# Patient Record
Sex: FEMALE | Race: WHITE | Employment: FULL TIME | URBAN - METROPOLITAN AREA
[De-identification: names, ages, dates, MRNs, and addresses within clinical notes are randomized per-mention and may not be internally consistent; named-entity substitution may affect disease eponyms.]

---

## 2017-02-06 ENCOUNTER — COMMUNICATION - HEALTHEAST (OUTPATIENT)
Dept: FAMILY MEDICINE | Facility: CLINIC | Age: 52
End: 2017-02-06

## 2017-02-06 DIAGNOSIS — K21.9 GERD (GASTROESOPHAGEAL REFLUX DISEASE): ICD-10-CM

## 2017-02-06 DIAGNOSIS — R56.9 SEIZURE (H): ICD-10-CM

## 2017-02-09 ENCOUNTER — OFFICE VISIT - HEALTHEAST (OUTPATIENT)
Dept: FAMILY MEDICINE | Facility: CLINIC | Age: 52
End: 2017-02-09

## 2017-02-09 ENCOUNTER — COMMUNICATION - HEALTHEAST (OUTPATIENT)
Dept: FAMILY MEDICINE | Facility: CLINIC | Age: 52
End: 2017-02-09

## 2017-02-09 DIAGNOSIS — B02.9 SHINGLES: ICD-10-CM

## 2017-02-15 ENCOUNTER — OFFICE VISIT - HEALTHEAST (OUTPATIENT)
Dept: FAMILY MEDICINE | Facility: CLINIC | Age: 52
End: 2017-02-15

## 2017-02-15 ENCOUNTER — COMMUNICATION - HEALTHEAST (OUTPATIENT)
Dept: SCHEDULING | Facility: CLINIC | Age: 52
End: 2017-02-15

## 2017-02-15 DIAGNOSIS — R42 LIGHT HEADEDNESS: ICD-10-CM

## 2017-02-15 DIAGNOSIS — I95.1 ORTHOSTATIC HYPOTENSION: ICD-10-CM

## 2017-02-15 DIAGNOSIS — Z00.00 ROUTINE GENERAL MEDICAL EXAMINATION AT A HEALTH CARE FACILITY: ICD-10-CM

## 2017-02-15 DIAGNOSIS — G40.309 GENERALIZED CONVULSIVE EPILEPSY (H): ICD-10-CM

## 2017-02-15 DIAGNOSIS — F43.9 STRESS AT HOME: ICD-10-CM

## 2017-02-15 DIAGNOSIS — B02.9 SHINGLES: ICD-10-CM

## 2017-02-15 LAB
CHOLEST SERPL-MCNC: 278 MG/DL
FASTING STATUS PATIENT QL REPORTED: NO
HBA1C MFR BLD: 4.8 % (ref 3.5–6)
HDLC SERPL-MCNC: 80 MG/DL
LDLC SERPL CALC-MCNC: 178 MG/DL
TRIGL SERPL-MCNC: 98 MG/DL

## 2017-02-15 ASSESSMENT — MIFFLIN-ST. JEOR: SCORE: 1415.16

## 2017-02-17 ENCOUNTER — COMMUNICATION - HEALTHEAST (OUTPATIENT)
Dept: FAMILY MEDICINE | Facility: CLINIC | Age: 52
End: 2017-02-17

## 2017-02-20 ENCOUNTER — COMMUNICATION - HEALTHEAST (OUTPATIENT)
Dept: FAMILY MEDICINE | Facility: CLINIC | Age: 52
End: 2017-02-20

## 2017-02-21 ENCOUNTER — OFFICE VISIT - HEALTHEAST (OUTPATIENT)
Dept: FAMILY MEDICINE | Facility: CLINIC | Age: 52
End: 2017-02-21

## 2017-02-21 DIAGNOSIS — F32.A ANXIETY AND DEPRESSION: ICD-10-CM

## 2017-02-21 DIAGNOSIS — G40.309 GENERALIZED CONVULSIVE EPILEPSY (H): ICD-10-CM

## 2017-02-21 DIAGNOSIS — B02.9 SHINGLES: ICD-10-CM

## 2017-02-21 DIAGNOSIS — F41.9 ANXIETY AND DEPRESSION: ICD-10-CM

## 2017-02-21 ASSESSMENT — MIFFLIN-ST. JEOR: SCORE: 1398.74

## 2017-04-12 ENCOUNTER — COMMUNICATION - HEALTHEAST (OUTPATIENT)
Dept: FAMILY MEDICINE | Facility: CLINIC | Age: 52
End: 2017-04-12

## 2017-04-17 ENCOUNTER — OFFICE VISIT - HEALTHEAST (OUTPATIENT)
Dept: FAMILY MEDICINE | Facility: CLINIC | Age: 52
End: 2017-04-17

## 2017-04-17 DIAGNOSIS — R56.9 CONVULSIONS, UNSPECIFIED CONVULSION TYPE (H): ICD-10-CM

## 2017-04-17 ASSESSMENT — MIFFLIN-ST. JEOR: SCORE: 1423.69

## 2017-06-01 ENCOUNTER — COMMUNICATION - HEALTHEAST (OUTPATIENT)
Dept: FAMILY MEDICINE | Facility: CLINIC | Age: 52
End: 2017-06-01

## 2017-06-01 DIAGNOSIS — R56.9 SEIZURE (H): ICD-10-CM

## 2017-07-05 ENCOUNTER — COMMUNICATION - HEALTHEAST (OUTPATIENT)
Dept: FAMILY MEDICINE | Facility: CLINIC | Age: 52
End: 2017-07-05

## 2017-07-05 DIAGNOSIS — R42 LIGHT HEADEDNESS: ICD-10-CM

## 2017-07-05 DIAGNOSIS — Z00.00 ROUTINE GENERAL MEDICAL EXAMINATION AT A HEALTH CARE FACILITY: ICD-10-CM

## 2017-07-05 DIAGNOSIS — F43.9 STRESS AT HOME: ICD-10-CM

## 2017-09-15 ENCOUNTER — RECORDS - HEALTHEAST (OUTPATIENT)
Dept: ADMINISTRATIVE | Facility: OTHER | Age: 52
End: 2017-09-15

## 2017-10-05 ENCOUNTER — OFFICE VISIT - HEALTHEAST (OUTPATIENT)
Dept: FAMILY MEDICINE | Facility: CLINIC | Age: 52
End: 2017-10-05

## 2017-10-05 DIAGNOSIS — R42 VERTIGO: ICD-10-CM

## 2017-10-19 ENCOUNTER — COMMUNICATION - HEALTHEAST (OUTPATIENT)
Dept: OTOLARYNGOLOGY | Facility: CLINIC | Age: 52
End: 2017-10-19

## 2017-10-27 ENCOUNTER — COMMUNICATION - HEALTHEAST (OUTPATIENT)
Dept: OTOLARYNGOLOGY | Facility: CLINIC | Age: 52
End: 2017-10-27

## 2017-12-07 ENCOUNTER — COMMUNICATION - HEALTHEAST (OUTPATIENT)
Dept: SCHEDULING | Facility: CLINIC | Age: 52
End: 2017-12-07

## 2018-03-28 ENCOUNTER — COMMUNICATION - HEALTHEAST (OUTPATIENT)
Dept: FAMILY MEDICINE | Facility: CLINIC | Age: 53
End: 2018-03-28

## 2018-03-28 DIAGNOSIS — R56.9 SEIZURE (H): ICD-10-CM

## 2018-05-03 ENCOUNTER — COMMUNICATION - HEALTHEAST (OUTPATIENT)
Dept: SCHEDULING | Facility: CLINIC | Age: 53
End: 2018-05-03

## 2018-05-04 ENCOUNTER — OFFICE VISIT - HEALTHEAST (OUTPATIENT)
Dept: FAMILY MEDICINE | Facility: CLINIC | Age: 53
End: 2018-05-04

## 2018-05-04 DIAGNOSIS — R42 LIGHT HEADEDNESS: ICD-10-CM

## 2018-05-04 DIAGNOSIS — F43.9 STRESS AT HOME: ICD-10-CM

## 2018-05-04 DIAGNOSIS — Z00.00 ROUTINE GENERAL MEDICAL EXAMINATION AT A HEALTH CARE FACILITY: ICD-10-CM

## 2018-05-04 ASSESSMENT — MIFFLIN-ST. JEOR: SCORE: 1514.41

## 2018-05-08 ENCOUNTER — COMMUNICATION - HEALTHEAST (OUTPATIENT)
Dept: SCHEDULING | Facility: CLINIC | Age: 53
End: 2018-05-08

## 2018-05-08 DIAGNOSIS — R56.9 SEIZURES (H): ICD-10-CM

## 2018-05-08 DIAGNOSIS — R42 DIZZINESS: ICD-10-CM

## 2018-05-10 ENCOUNTER — COMMUNICATION - HEALTHEAST (OUTPATIENT)
Dept: TELEHEALTH | Facility: CLINIC | Age: 53
End: 2018-05-10

## 2018-05-10 ENCOUNTER — HOSPITAL ENCOUNTER (OUTPATIENT)
Dept: MRI IMAGING | Facility: CLINIC | Age: 53
Discharge: HOME | End: 2018-05-10
Attending: FAMILY MEDICINE

## 2018-05-10 DIAGNOSIS — R42 DIZZINESS: ICD-10-CM

## 2018-05-10 DIAGNOSIS — R56.9 SEIZURES (H): ICD-10-CM

## 2018-05-11 ENCOUNTER — COMMUNICATION - HEALTHEAST (OUTPATIENT)
Dept: FAMILY MEDICINE | Facility: CLINIC | Age: 53
End: 2018-05-11

## 2018-05-18 ENCOUNTER — COMMUNICATION - HEALTHEAST (OUTPATIENT)
Dept: FAMILY MEDICINE | Facility: CLINIC | Age: 53
End: 2018-05-18

## 2018-05-18 DIAGNOSIS — R42 VERTIGO: ICD-10-CM

## 2018-05-22 ENCOUNTER — COMMUNICATION - HEALTHEAST (OUTPATIENT)
Dept: ADMINISTRATIVE | Facility: CLINIC | Age: 53
End: 2018-05-22

## 2018-06-11 ENCOUNTER — COMMUNICATION - HEALTHEAST (OUTPATIENT)
Dept: ADMINISTRATIVE | Facility: CLINIC | Age: 53
End: 2018-06-11

## 2018-06-21 ENCOUNTER — OFFICE VISIT (OUTPATIENT)
Dept: URGENT CARE | Facility: URGENT CARE | Age: 53
End: 2018-06-21
Payer: COMMERCIAL

## 2018-06-21 VITALS
DIASTOLIC BLOOD PRESSURE: 60 MMHG | HEIGHT: 65 IN | HEART RATE: 84 BPM | SYSTOLIC BLOOD PRESSURE: 122 MMHG | TEMPERATURE: 98.2 F | WEIGHT: 197 LBS | BODY MASS INDEX: 32.82 KG/M2 | OXYGEN SATURATION: 98 %

## 2018-06-21 DIAGNOSIS — N30.00 ACUTE CYSTITIS WITHOUT HEMATURIA: Primary | ICD-10-CM

## 2018-06-21 DIAGNOSIS — R30.0 DYSURIA: ICD-10-CM

## 2018-06-21 DIAGNOSIS — R82.90 NONSPECIFIC FINDING ON EXAMINATION OF URINE: ICD-10-CM

## 2018-06-21 LAB
ALBUMIN UR-MCNC: 100 MG/DL
APPEARANCE UR: CLEAR
BILIRUB UR QL STRIP: NEGATIVE
COLOR UR AUTO: YELLOW
GLUCOSE UR STRIP-MCNC: NEGATIVE MG/DL
HGB UR QL STRIP: ABNORMAL
KETONES UR STRIP-MCNC: ABNORMAL MG/DL
LEUKOCYTE ESTERASE UR QL STRIP: ABNORMAL
NITRATE UR QL: NEGATIVE
PH UR STRIP: 6.5 PH (ref 5–7)
RBC #/AREA URNS AUTO: ABNORMAL /HPF
SOURCE: ABNORMAL
SP GR UR STRIP: 1.02 (ref 1–1.03)
UROBILINOGEN UR STRIP-ACNC: 0.2 EU/DL (ref 0.2–1)
WBC #/AREA URNS AUTO: ABNORMAL /HPF

## 2018-06-21 PROCEDURE — 87088 URINE BACTERIA CULTURE: CPT | Performed by: INTERNAL MEDICINE

## 2018-06-21 PROCEDURE — 81001 URINALYSIS AUTO W/SCOPE: CPT | Performed by: INTERNAL MEDICINE

## 2018-06-21 PROCEDURE — 87086 URINE CULTURE/COLONY COUNT: CPT | Performed by: INTERNAL MEDICINE

## 2018-06-21 PROCEDURE — 99203 OFFICE O/P NEW LOW 30 MIN: CPT | Performed by: INTERNAL MEDICINE

## 2018-06-21 PROCEDURE — 87186 SC STD MICRODIL/AGAR DIL: CPT | Performed by: INTERNAL MEDICINE

## 2018-06-21 RX ORDER — SULFAMETHOXAZOLE/TRIMETHOPRIM 800-160 MG
1 TABLET ORAL 2 TIMES DAILY
Qty: 10 TABLET | Refills: 0 | Status: SHIPPED | OUTPATIENT
Start: 2018-06-21 | End: 2018-06-26

## 2018-06-21 ASSESSMENT — ENCOUNTER SYMPTOMS
HEMATURIA: 0
FEVER: 0
CHILLS: 0

## 2018-06-21 NOTE — MR AVS SNAPSHOT
"              After Visit Summary   2018    Ana Barrera    MRN: [id number]           Patient Information     Date Of Birth          1965        Visit Information        Provider Department      2018 8:00 PM Zena Rivers MD [facility]        Today's Diagnoses     Acute cystitis without hematuria    -  1    Dysuria        Nonspecific finding on examination of urine           Follow-ups after your visit        Who to contact     If you have questions or need follow up information about today's clinic visit or your schedule please contact [facility] directly at [phone].  Normal or non-critical lab and imaging results will be communicated to you by Betablehart, letter or phone within 4 business days after the clinic has received the results. If you do not hear from us within 7 days, please contact the clinic through Betablehart or phone. If you have a critical or abnormal lab result, we will notify you by phone as soon as possible.  Submit refill requests through Wish Days or call your pharmacy and they will forward the refill request to us. Please allow 3 business days for your refill to be completed.          Additional Information About Your Visit        MyChart Information     Wish Days lets you send messages to your doctor, view your test results, renew your prescriptions, schedule appointments and more. To sign up, go to [email or web address] . Click on \"Log in\" on the left side of the screen, which will take you to the Welcome page. Then click on \"Sign up Now\" on the right side of the page.     You will be asked to enter the access code listed below, as well as some personal information. Please follow the directions to create your username and password.     Your access code is: [email or web address]  Expires: 2018  8:40 PM     Your access code will  in 90 days. If you need help or a new code, please call your [place] clinic or " "[phone].        Care EveryWhere ID     This is your Care EveryWhere ID. This could be used by other organizations to access your [place] medical records  [id number]        Your Vitals Were     Pulse Temperature Height Pulse Oximetry BMI (Body Mass Index)       84 98.2  F (36.8  C) (Oral) 5' 5\" (1.651 m) 98% 32.78 kg/m2        Blood Pressure from Last 3 Encounters:   06/21/18 122/60    Weight from Last 3 Encounters:   06/21/18 197 lb (89.4 kg)              We Performed the Following     *UA reflex to Microscopic and Culture ([place] [facility] (except [place] and [place])     Urine Culture Aerobic Bacterial     Urine Microscopic          Today's Medication Changes          These changes are accurate as of 6/21/18  8:40 PM.  If you have any questions, ask your nurse or doctor.               Start taking these medicines.        Dose/Directions    sulfamethoxazole-trimethoprim 800-160 MG per tablet   Commonly known as:  BACTRIM DS/SEPTRA DS   Used for:  Acute cystitis without hematuria   Started by:  Zena Rivers MD        Dose:  1 tablet   Take 1 tablet by mouth 2 times daily for 5 days   Quantity:  10 tablet   Refills:  0            Where to get your medicines      These medications were sent to [id number] - [place], [place] - [address] AT [facility] [place]  [address], [place] [address]     Phone:  [phone]     sulfamethoxazole-trimethoprim 800-160 MG per tablet                Primary Care Provider Office Phone # Fax #    Aj SEVILLA Darinel [phone]       [facility] [address] [address]        Equal Access to Services     AUBRIE MITCHELL AH: Hadii aad ku hadashmichael Soomaali, waaxda luqadaha, qaybta kaalmada eddieyalis, jeremi schroeder. So [facility] [phone].    ATENCIÓN: Si habla español, tiene a jimenez disposición servicios gratuitos de asistencia lingüística. Llame al [phone].    We comply " with applicable federal civil rights laws and [place] laws. We do not discriminate on the basis of race, color, national origin, age, disability, sex, sexual orientation, or gender identity.            Thank you!     Thank you for choosing [facility]  for your care. Our goal is always to provide you with excellent care. Hearing back from our patients is one way we can continue to improve our services. Please take a few minutes to complete the written survey that you may receive in the mail after your visit with us. Thank you!             Your Updated Medication List - Protect others around you: Learn how to safely use, store and throw away your medicines at www.disposemymeds.org.          This list is accurate as of 6/21/18  8:40 PM.  Always use your most recent med list.                   Brand Name Dispense Instructions for use Diagnosis    DEPAKOTE PO           sulfamethoxazole-trimethoprim 800-160 MG per tablet    BACTRIM DS/SEPTRA DS    10 tablet    Take 1 tablet by mouth 2 times daily for 5 days    Acute cystitis without hematuria

## 2018-06-22 NOTE — NURSING NOTE
"Ana Barrera;   Chief Complaint   Patient presents with     Urinary Problem     urinary frequency, pressure, bloating onset a few days ago      Urgent Care     Initial /60 (BP Location: Left arm, Patient Position: Chair, Cuff Size: Adult Regular)  Pulse 84  Temp 98.2  F (36.8  C) (Oral)  Ht 5' 5\" (1.651 m)  Wt 197 lb (89.4 kg)  SpO2 98%  BMI 32.78 kg/m2 Estimated body mass index is 32.78 kg/(m^2) as calculated from the following:    Height as of this encounter: 5' 5\" (1.651 m).    Weight as of this encounter: 197 lb (89.4 kg)..  BP completed using cuff size regular.  Bertha Quiroga R.N.  "

## 2018-06-22 NOTE — PROGRESS NOTES
"SUBJECTIVE:   Ana Barrera is a 52 year old female presenting with a chief complaint of   Chief Complaint   Patient presents with     Urinary Problem     urinary frequency, pressure, bloating onset a few days ago      Urgent Care       She is a new patient of [facility].    UTI    Onset of symptoms was today  Course of illness is worsening  Current and associated symptoms frequency, urgency and suprapubic pain and pressure  Treatment and measures tried None  Predisposing factors include history of frequent UTI's  Patient denies vaginal discharge, vaginal odor and vaginal itching      Bloating yesterday  Some back aches        Review of Systems   Constitutional: Negative for chills and fever.   Genitourinary: Negative for hematuria.       Past Medical History:   Diagnosis Date     Epilepsy (H)      No family history on file.  Current Outpatient Prescriptions   Medication Sig Dispense Refill     Divalproex Sodium (DEPAKOTE PO)        sulfamethoxazole-trimethoprim (BACTRIM DS/SEPTRA DS) 800-160 MG per tablet Take 1 tablet by mouth 2 times daily for 5 days 10 tablet 0     Social History   Substance Use Topics     Smoking status: Never Smoker     Smokeless tobacco: Never Used     Alcohol use Not on file       OBJECTIVE  /60 (BP Location: Left arm, Patient Position: Chair, Cuff Size: Adult Regular)  Pulse 84  Temp 98.2  F (36.8  C) (Oral)  Ht 5' 5\" (1.651 m)  Wt 197 lb (89.4 kg)  SpO2 98%  BMI 32.78 kg/m2    Physical Exam   Constitutional: She appears well-developed and well-nourished.   Abdominal: Soft.   Mild suprapubic discomfort   Musculoskeletal:   No cva tenderness       Labs:  Results for orders placed or performed in visit on 06/21/18 (from the past 24 hour(s))   *UA reflex to Microscopic and Culture ([place] and [facility] (except [place] and [place])   Result Value Ref Range    Color Urine Yellow     Appearance Urine Clear     Glucose Urine Negative NEG^Negative mg/dL    Bilirubin Urine " Negative NEG^Negative    Ketones Urine Trace (A) NEG^Negative mg/dL    Specific Gravity Urine 1.025 1.003 - 1.035    Blood Urine Large (A) NEG^Negative    pH Urine 6.5 5.0 - 7.0 pH    Protein Albumin Urine 100 (A) NEG^Negative mg/dL    Urobilinogen Urine 0.2 0.2 - 1.0 EU/dL    Nitrite Urine Negative NEG^Negative    Leukocyte Esterase Urine Moderate (A) NEG^Negative    Source Midstream Urine    Urine Microscopic   Result Value Ref Range    WBC Urine  (A) OTO5^0 - 5 /HPF    RBC Urine 25-50 (A) OTO2^O - 2 /HPF     ASSESSMENT:      ICD-10-CM    1. Acute cystitis without hematuria N30.00 sulfamethoxazole-trimethoprim (BACTRIM DS/SEPTRA DS) 800-160 MG per tablet   2. Dysuria R30.0 *UA reflex to Microscopic and Culture ([place] and [facility] (except [place] and [place])     Urine Microscopic   3. Nonspecific finding on examination of urine R82.90 Urine Culture Aerobic Bacterial        Medical Decision Making:      Serious Comorbid Conditions:  Adult:  epilepsy    PLAN:    UTI Adult:  NO Sulfa Allergy: Septra DS one tablet twice daily for 3 - 5 days    Followup:    If not improving or if condition worsens, follow up with your Primary Care Provider

## 2018-06-25 LAB
BACTERIA SPEC CULT: ABNORMAL
SPECIMEN SOURCE: ABNORMAL

## 2018-06-26 ENCOUNTER — COMMUNICATION - HEALTHEAST (OUTPATIENT)
Dept: ADMINISTRATIVE | Facility: CLINIC | Age: 53
End: 2018-06-26

## 2018-07-31 ENCOUNTER — COMMUNICATION - HEALTHEAST (OUTPATIENT)
Dept: FAMILY MEDICINE | Facility: CLINIC | Age: 53
End: 2018-07-31

## 2018-07-31 DIAGNOSIS — R56.9 SEIZURE (H): ICD-10-CM

## 2018-08-13 ENCOUNTER — COMMUNICATION - HEALTHEAST (OUTPATIENT)
Dept: SCHEDULING | Facility: CLINIC | Age: 53
End: 2018-08-13

## 2018-10-14 ENCOUNTER — COMMUNICATION - HEALTHEAST (OUTPATIENT)
Dept: FAMILY MEDICINE | Facility: CLINIC | Age: 53
End: 2018-10-14

## 2018-10-14 DIAGNOSIS — R56.9 SEIZURE (H): ICD-10-CM

## 2018-12-28 ENCOUNTER — COMMUNICATION - HEALTHEAST (OUTPATIENT)
Dept: SCHEDULING | Facility: CLINIC | Age: 53
End: 2018-12-28

## 2019-01-15 ENCOUNTER — COMMUNICATION - HEALTHEAST (OUTPATIENT)
Dept: FAMILY MEDICINE | Facility: CLINIC | Age: 54
End: 2019-01-15

## 2019-01-15 DIAGNOSIS — R42 LIGHT HEADEDNESS: ICD-10-CM

## 2019-01-15 DIAGNOSIS — Z00.00 ROUTINE GENERAL MEDICAL EXAMINATION AT A HEALTH CARE FACILITY: ICD-10-CM

## 2019-01-15 DIAGNOSIS — F43.9 STRESS AT HOME: ICD-10-CM

## 2019-02-15 ENCOUNTER — COMMUNICATION - HEALTHEAST (OUTPATIENT)
Dept: FAMILY MEDICINE | Facility: CLINIC | Age: 54
End: 2019-02-15

## 2019-02-26 ENCOUNTER — COMMUNICATION - HEALTHEAST (OUTPATIENT)
Dept: FAMILY MEDICINE | Facility: CLINIC | Age: 54
End: 2019-02-26

## 2019-03-07 ENCOUNTER — COMMUNICATION - HEALTHEAST (OUTPATIENT)
Dept: FAMILY MEDICINE | Facility: CLINIC | Age: 54
End: 2019-03-07

## 2019-03-07 ENCOUNTER — OFFICE VISIT - HEALTHEAST (OUTPATIENT)
Dept: FAMILY MEDICINE | Facility: CLINIC | Age: 54
End: 2019-03-07

## 2019-03-07 DIAGNOSIS — R56.9 SEIZURE (H): ICD-10-CM

## 2019-03-07 DIAGNOSIS — N95.1 MENOPAUSAL SYNDROME (HOT FLASHES): ICD-10-CM

## 2019-03-07 DIAGNOSIS — F43.9 STRESS AT HOME: ICD-10-CM

## 2019-03-07 DIAGNOSIS — N95.1 SYMPTOMATIC MENOPAUSAL OR FEMALE CLIMACTERIC STATES: ICD-10-CM

## 2019-03-07 DIAGNOSIS — G47.09 OTHER INSOMNIA: ICD-10-CM

## 2019-03-07 DIAGNOSIS — F33.1 MODERATE EPISODE OF RECURRENT MAJOR DEPRESSIVE DISORDER (H): ICD-10-CM

## 2019-03-07 DIAGNOSIS — R53.83 OTHER FATIGUE: ICD-10-CM

## 2019-03-07 ASSESSMENT — MIFFLIN-ST. JEOR: SCORE: 1531.42

## 2019-04-26 ENCOUNTER — RECORDS - HEALTHEAST (OUTPATIENT)
Dept: MAMMOGRAPHY | Facility: CLINIC | Age: 54
End: 2019-04-26

## 2019-04-26 ENCOUNTER — OFFICE VISIT - HEALTHEAST (OUTPATIENT)
Dept: FAMILY MEDICINE | Facility: CLINIC | Age: 54
End: 2019-04-26

## 2019-04-26 DIAGNOSIS — Z12.11 SCREEN FOR COLON CANCER: ICD-10-CM

## 2019-04-26 DIAGNOSIS — R10.13 DYSPEPSIA: ICD-10-CM

## 2019-04-26 DIAGNOSIS — Z12.31 ENCOUNTER FOR SCREENING MAMMOGRAM FOR MALIGNANT NEOPLASM OF BREAST: ICD-10-CM

## 2019-04-26 DIAGNOSIS — Z12.31 VISIT FOR SCREENING MAMMOGRAM: ICD-10-CM

## 2019-04-26 DIAGNOSIS — B02.9 HERPES ZOSTER WITHOUT COMPLICATION: ICD-10-CM

## 2019-04-26 ASSESSMENT — MIFFLIN-ST. JEOR: SCORE: 1555.23

## 2019-06-18 ENCOUNTER — COMMUNICATION - HEALTHEAST (OUTPATIENT)
Dept: FAMILY MEDICINE | Facility: CLINIC | Age: 54
End: 2019-06-18

## 2019-06-18 DIAGNOSIS — R56.9 SEIZURE (H): ICD-10-CM

## 2019-09-10 ENCOUNTER — COMMUNICATION - HEALTHEAST (OUTPATIENT)
Dept: FAMILY MEDICINE | Facility: CLINIC | Age: 54
End: 2019-09-10

## 2019-09-13 ENCOUNTER — COMMUNICATION - HEALTHEAST (OUTPATIENT)
Dept: FAMILY MEDICINE | Facility: CLINIC | Age: 54
End: 2019-09-13

## 2019-09-24 ENCOUNTER — COMMUNICATION - HEALTHEAST (OUTPATIENT)
Dept: FAMILY MEDICINE | Facility: CLINIC | Age: 54
End: 2019-09-24

## 2019-09-24 DIAGNOSIS — R42 LIGHT HEADEDNESS: ICD-10-CM

## 2019-09-24 DIAGNOSIS — Z00.00 ROUTINE GENERAL MEDICAL EXAMINATION AT A HEALTH CARE FACILITY: ICD-10-CM

## 2019-09-24 DIAGNOSIS — R56.9 SEIZURE (H): ICD-10-CM

## 2019-09-24 DIAGNOSIS — F43.9 STRESS AT HOME: ICD-10-CM

## 2019-09-30 ENCOUNTER — COMMUNICATION - HEALTHEAST (OUTPATIENT)
Dept: FAMILY MEDICINE | Facility: CLINIC | Age: 54
End: 2019-09-30

## 2019-09-30 ENCOUNTER — OFFICE VISIT - HEALTHEAST (OUTPATIENT)
Dept: FAMILY MEDICINE | Facility: CLINIC | Age: 54
End: 2019-09-30

## 2019-09-30 DIAGNOSIS — R56.9 SEIZURE (H): ICD-10-CM

## 2019-09-30 DIAGNOSIS — F33.1 MODERATE EPISODE OF RECURRENT MAJOR DEPRESSIVE DISORDER (H): ICD-10-CM

## 2019-09-30 ASSESSMENT — MIFFLIN-ST. JEOR: SCORE: 1523.48

## 2019-10-14 ENCOUNTER — COMMUNICATION - HEALTHEAST (OUTPATIENT)
Dept: FAMILY MEDICINE | Facility: CLINIC | Age: 54
End: 2019-10-14

## 2019-10-22 ENCOUNTER — COMMUNICATION - HEALTHEAST (OUTPATIENT)
Dept: ADMINISTRATIVE | Facility: CLINIC | Age: 54
End: 2019-10-22

## 2019-12-10 ENCOUNTER — COMMUNICATION - HEALTHEAST (OUTPATIENT)
Dept: FAMILY MEDICINE | Facility: CLINIC | Age: 54
End: 2019-12-10

## 2019-12-10 DIAGNOSIS — R56.9 SEIZURE (H): ICD-10-CM

## 2020-02-26 ENCOUNTER — COMMUNICATION - HEALTHEAST (OUTPATIENT)
Dept: FAMILY MEDICINE | Facility: CLINIC | Age: 55
End: 2020-02-26

## 2020-07-01 ENCOUNTER — COMMUNICATION - HEALTHEAST (OUTPATIENT)
Dept: FAMILY MEDICINE | Facility: CLINIC | Age: 55
End: 2020-07-01

## 2020-10-14 ENCOUNTER — OFFICE VISIT - HEALTHEAST (OUTPATIENT)
Dept: FAMILY MEDICINE | Facility: CLINIC | Age: 55
End: 2020-10-14

## 2020-10-14 DIAGNOSIS — F33.1 MODERATE EPISODE OF RECURRENT MAJOR DEPRESSIVE DISORDER (H): ICD-10-CM

## 2020-10-14 DIAGNOSIS — G47.09 OTHER INSOMNIA: ICD-10-CM

## 2020-10-14 DIAGNOSIS — Z56.0 UNEMPLOYED: ICD-10-CM

## 2020-10-14 DIAGNOSIS — Z78.9 ALCOHOL CONSUMPTION HEAVY: ICD-10-CM

## 2020-10-14 DIAGNOSIS — R56.9 SEIZURE (H): ICD-10-CM

## 2020-10-14 SDOH — ECONOMIC STABILITY - INCOME SECURITY: UNEMPLOYMENT, UNSPECIFIED: Z56.0

## 2020-10-14 ASSESSMENT — PATIENT HEALTH QUESTIONNAIRE - PHQ9: SUM OF ALL RESPONSES TO PHQ QUESTIONS 1-9: 16

## 2020-10-14 ASSESSMENT — MIFFLIN-ST. JEOR: SCORE: 1574.46

## 2021-04-16 ENCOUNTER — COMMUNICATION - HEALTHEAST (OUTPATIENT)
Dept: FAMILY MEDICINE | Facility: CLINIC | Age: 56
End: 2021-04-16

## 2021-04-16 DIAGNOSIS — G47.09 OTHER INSOMNIA: ICD-10-CM

## 2021-05-27 ASSESSMENT — PATIENT HEALTH QUESTIONNAIRE - PHQ9: SUM OF ALL RESPONSES TO PHQ QUESTIONS 1-9: 16

## 2021-05-28 NOTE — PROGRESS NOTES
"  Chief Complaint   Patient presents with     Back Pain     Thinks it could be shingles          HPI:   Ana Barrera is a 53 y.o. female has pain left side mid back for the last couple of days.  Feels like when she had shingles on the right side a couple of years ago. Not an achy type of pain.  No rash noted.  No known injury.  Localized to mid back. No fever.  In addition, she has been experiencing some epigastric distress.  Mostly uncomfortableness.  Feels kind of gassy.  Not really heartburn.  No nausea.  No vomiting.  Eating ok.  Has taken pepto bismal that helps      ROS:  A 10 point comprehensive review of systems was negative except as noted.     Medications:  Current Outpatient Medications on File Prior to Visit   Medication Sig Dispense Refill     divalproex (DEPAKOTE DR) 500 MG 12 hour tablet Take 1 tablet (500 mg total) by mouth 2 (two) times a day. 180 tablet 0     estrogens, conjugated, (PREMARIN) 0.625 MG tablet Take 1 tablet (0.625 mg total) by mouth daily. 30 tablet 11     PARoxetine (PAXIL) 20 MG tablet TAKE 1 TABLET(20 MG) BY MOUTH DAILY 30 tablet 6     meclizine (ANTIVERT) 25 mg tablet Take 2 tablets (50 mg total) by mouth 3 (three) times a day as needed. 90 tablet 1     valACYclovir (VALTREX) 500 MG tablet Take 2 tablets (1,000 mg total) by mouth 3 (three) times a day. 21 tablet 0     No current facility-administered medications on file prior to visit.          Social History:  Social History     Tobacco Use     Smoking status: Passive Smoke Exposure - Never Smoker     Smokeless tobacco: Never Used     Tobacco comment:  smokes in and out of home   Substance Use Topics     Alcohol use: Not on file         Physical Exam:   Vitals:    04/26/19 1308   BP: 128/66   Pulse: 97   Resp: 16   Temp: 97.7  F (36.5  C)   TempSrc: Oral   SpO2: 97%   Weight: 208 lb (94.3 kg)   Height: 5' 6\" (1.676 m)       GENERAL:   Alert. Oriented.  EYES: Clear  HENT:  Ears: R TM pearly gray. Normal landmarks. L " TM pearly gray.  Normal landmarks  Nose: Clear.  Sinuses: Nontender.  Oropharynx:  No erythema. No exudate.  NECK: Supple. No adenopathy.  LUNGS: Clear to ascultation.  No crackles.  No wheezing  HEART: RRR  SKIN:  No rash.   BACK:  No tenderness over the site of discomfort.  No tenderness over the spine to percussion..  ABDOMEN:  +BS. Slight epigastric tenderness. Soft, no guarding, rebound, rigidity,mass, or organomegaly. No CVA tenderness          Assessment/Plan:    1. Visit for screening mammogram  Completed today  - Mammo Screening Bilateral; Future    2. Screen for colon cancer  Information given  - Ambulatory referral for Colonoscopy    3. Herpes zoster without complication  Due to similarity of the pain with previous episode with start famvir.  She has been quite uncomfortable with the pain.  Started gabapentin.  May use tylenol and ibuprofen if needed.  May try capsaicin cream.  Recheck if pain doesn't improve or worsens within 1-2 weeks.  - famciclovir (FAMVIR) 500 MG tablet; Take 1 tablet (500 mg total) by mouth 3 (three) times a day for 7 days.  Dispense: 21 tablet; Refill: 0  - gabapentin (NEURONTIN) 100 MG capsule; Take 100 mg by mouth 3 (three) times a day.  Dispense: 60 capsule; Refill: 2    4. Dyspepsia  Trial of ranitidine.  May use antacid as needed.  - ranitidine (ZANTAC) 300 MG tablet; Take 1 tablet (300 mg total) by mouth at bedtime.  Dispense: 30 tablet; Refill: 11           Janie Garcia MD      4/26/2019    The following portions of the patient's history were reviewed and updated as appropriate: allergies, current medications, past family history, past medical history, past social history, past surgical history and problem list.

## 2021-05-29 NOTE — TELEPHONE ENCOUNTER
RN cannot approve Refill Request    RN can NOT refill this medication overdue for office visits and/or labs.    Gaudencio Arana, [facility] Triage/Med Refill 6/19/2019    Requested Prescriptions   Pending Prescriptions Disp Refills     divalproex (DEPAKOTE DR) 500 MG 12 hour tablet [Pharmacy Med Name: DIVALPROEX DELAYED RELEASE 500MG TB] 180 tablet 0     Sig: TAKE 1 TABLET(500 MG) BY MOUTH TWICE DAILY       Divalproex/Valproic Acid Refill Protocol Failed - 6/18/2019  6:02 PM        Failed - LFT or AST or ALT in last 12 months     Albumin   Date Value Ref Range Status   01/15/2015 4.3 3.5 - 5.0 g/dL Final     Bilirubin, Total   Date Value Ref Range Status   01/15/2015 0.5 0.0 - 1.0 mg/dL Final     Alkaline Phosphatase   Date Value Ref Range Status   01/15/2015 59 45 - 120 U/L Final     AST   Date Value Ref Range Status   01/15/2015 14 0 - 40 U/L Final     ALT   Date Value Ref Range Status   01/15/2015 11 0 - 45 U/L Final     Protein, Total   Date Value Ref Range Status   01/15/2015 7.5 6.0 - 8.0 g/dL Final                Failed - CBC w/ plts (Hm2) in last 12 months      WBC   Date Value Ref Range Status   02/15/2017 4.4 4.0 - 11.0 thou/uL Final     Hemoglobin   Date Value Ref Range Status   02/15/2017 15.7 12.0 - 16.0 g/dL Final     Hematocrit   Date Value Ref Range Status   02/15/2017 45.6 35.0 - 47.0 % Final     Platelets   Date Value Ref Range Status   02/15/2017 236 140 - 440 thou/uL Final             Passed - PCP or prescribing provider visit in last 12 months       Last office visit with prescriber/PCP: 3/7/2019 Susannah Davison MD OR same dept: 4/26/2019 Janie Garcia MD OR same specialty: 4/26/2019 Janie Garcia MD  Last physical: Visit date not found Last MTM visit: Visit date not found   Next visit within 3 mo: Visit date not found  Next physical within 3 mo: Visit date not found  Prescriber OR PCP: Susannah Davison MD  Last diagnosis associated with med order: 1. Seizure (H)  -  divalproex (DEPAKOTE DR) 500 MG 12 hour tablet [Pharmacy Med Name: DIVALPROEX DELAYED RELEASE 500MG TB]; TAKE 1 TABLET(500 MG) BY MOUTH TWICE DAILY  Dispense: 180 tablet; Refill: 0    If protocol passes may refill for 12 months if within 3 months of last provider visit (or a total of 15 months).

## 2021-05-30 VITALS — BODY MASS INDEX: 27.88 KG/M2 | WEIGHT: 173.5 LBS | HEIGHT: 66 IN

## 2021-05-30 VITALS — HEIGHT: 66 IN | WEIGHT: 177.12 LBS | BODY MASS INDEX: 28.47 KG/M2

## 2021-05-30 VITALS — BODY MASS INDEX: 28.77 KG/M2 | WEIGHT: 179 LBS | HEIGHT: 66 IN

## 2021-05-30 VITALS — WEIGHT: 178 LBS | BODY MASS INDEX: 29.62 KG/M2

## 2021-05-31 VITALS — WEIGHT: 193.6 LBS | BODY MASS INDEX: 31.25 KG/M2

## 2021-06-01 ENCOUNTER — RECORDS - HEALTHEAST (OUTPATIENT)
Dept: ADMINISTRATIVE | Facility: CLINIC | Age: 56
End: 2021-06-01

## 2021-06-01 VITALS — WEIGHT: 199 LBS | BODY MASS INDEX: 31.98 KG/M2 | HEIGHT: 66 IN

## 2021-06-01 NOTE — TELEPHONE ENCOUNTER
This was disappointing for Dr. Phillips. Found an appointment time and invited her in, then she no-showed.

## 2021-06-01 NOTE — TELEPHONE ENCOUNTER
Who is calling:  The patient  Reason for Call:  The patient states that Yes, she needs them filled out again.  Date of last appointment with primary care: n/a  Okay to leave a detailed message: Yes

## 2021-06-01 NOTE — TELEPHONE ENCOUNTER
LVM to let her know we need a signature from her to be able to fax medical statement paperwork. If she could come in and sign in I will fax it. Forms will be at the  if she comes in to sign them. Thanks

## 2021-06-01 NOTE — PROGRESS NOTES
"  Chief Complaint   Patient presents with     Depression         HPI:   Ana Barrera is a 54 y.o. female c/o depression and anxiety.  Has been on paxil for at least several months.  Denies suicidal ideation  Recently terminated from job, losing insurance.  Has seizure disorder.  Has been having seizures about once a week.  She has missed work often and this is why she has been terminated.  She thinks it is related to not sleeping at night.  Has hot flashes and has been on premarin which is helping some.    ROS:  A 10 point comprehensive review of systems was negative except as noted.     Medications:  Current Outpatient Medications on File Prior to Visit   Medication Sig Dispense Refill     divalproex (DEPAKOTE DR) 500 MG 12 hour tablet TAKE 1 TABLET(500 MG) BY MOUTH TWICE DAILY 180 tablet 0     estrogens, conjugated, (PREMARIN) 0.625 MG tablet Take 1 tablet (0.625 mg total) by mouth daily. 30 tablet 11     PARoxetine (PAXIL) 20 MG tablet Take 1 tablet (20 mg total) by mouth daily. 90 tablet 0     gabapentin (NEURONTIN) 100 MG capsule Take 100 mg by mouth 3 (three) times a day. 60 capsule 2     meclizine (ANTIVERT) 25 mg tablet Take 2 tablets (50 mg total) by mouth 3 (three) times a day as needed. 90 tablet 1     ranitidine (ZANTAC) 300 MG tablet Take 1 tablet (300 mg total) by mouth at bedtime. 30 tablet 11     valACYclovir (VALTREX) 500 MG tablet Take 2 tablets (1,000 mg total) by mouth 3 (three) times a day. 21 tablet 0     No current facility-administered medications on file prior to visit.          Social History:  Social History     Tobacco Use     Smoking status: Passive Smoke Exposure - Never Smoker     Smokeless tobacco: Never Used     Tobacco comment:  smokes in and out of home   Substance Use Topics     Alcohol use: Not on file         Physical Exam:   Vitals:    09/30/19 1339   BP: 128/70   Pulse: 98   Resp: 16   SpO2: 99%   Weight: 201 lb (91.2 kg)   Height: 5' 6\" (1.676 m)       GEN:  " NAD  MS:  A&O.  Affect weepy Mood depressed Eye contact decreased Thoughts linear and logical.  Well-groomed.  Speech normal.  No psychomotor agitation or retardation.  Denies suicidal ideation.               Assessment/Plan:  1. Moderate episode of recurrent major depressive disorder (H)  Will increase paxil to 40 mg daily.  Strongly recommended counseling  - PARoxetine (PAXIL) 20 MG tablet; Take 2 tablets (40 mg total) by mouth daily.  Dispense: 180 tablet; Refill: 3    2. Seizure (H)  Increased seizure frequency.  She has only been taking depakote once daily for a few days.  Level unlikely to be accurate at this time.  I think this needs to be checked when she is on her usual dose.  Also needs to see neurology--she hasn't seen them for several years.  - divalproex (DEPAKOTE DR) 500 MG 12 hour tablet; TAKE 1 TABLET(500 MG) BY MOUTH TWICE DAILY  Dispense: 180 tablet; Refill: 3  - Valproic Acid, Free (Depakene , Free); Future  - Ambulatory referral to Neurology     She brings unemployment paperwork which was completed.    Offered a visit with our  to help get her insurance sorted out.  She should be seen here within three months.      Janie Garcia MD      9/30/2019    The following portions of the patient's history were reviewed and updated as appropriate: allergies, current medications, past family history, past medical history, past social history, past surgical history and problem list.

## 2021-06-01 NOTE — TELEPHONE ENCOUNTER
Former patient of [facility] & has not established care with another provider.  Please assign refill request to covering provider per Clinic standard process.      Refill Approved    Rx renewed per Medication Renewal Policy. Medication was last renewed on 1/16/19.    Hannah Phillips, [facility] Triage/Med Refill 9/25/2019     Requested Prescriptions   Pending Prescriptions Disp Refills     PARoxetine (PAXIL) 20 MG tablet 30 tablet 6     Sig: Take 1 tablet (20 mg total) by mouth daily.       SSRI Refill Protocol  Passed - 9/24/2019 12:03 PM        Passed - PCP or prescribing provider visit in last year     Last office visit with prescriber/PCP: 3/7/2019 Susannah Davison MD OR same dept: 4/26/2019 Janie Garcia MD OR same specialty: 4/26/2019 Janie Garcia MD  Last physical: Visit date not found Last MTM visit: Visit date not found   Next visit within 3 mo: Visit date not found  Next physical within 3 mo: Visit date not found  Prescriber OR PCP: Susannah Davison MD  Last diagnosis associated with med order: 1. Routine general medical examination at a health care facility  - PARoxetine (PAXIL) 20 MG tablet; Take 1 tablet (20 mg total) by mouth daily.  Dispense: 30 tablet; Refill: 6    2. Light headedness  - PARoxetine (PAXIL) 20 MG tablet; Take 1 tablet (20 mg total) by mouth daily.  Dispense: 30 tablet; Refill: 6    3. Stress at home  - PARoxetine (PAXIL) 20 MG tablet; Take 1 tablet (20 mg total) by mouth daily.  Dispense: 30 tablet; Refill: 6    4. Seizure (H)  - divalproex (DEPAKOTE DR) 500 MG 12 hour tablet  Dispense: 180 tablet; Refill: 0    If protocol passes may refill for 12 months if within 3 months of last provider visit (or a total of 15 months).             divalproex (DEPAKOTE DR) 500 MG 12 hour tablet 180 tablet 0       Divalproex/Valproic Acid Refill Protocol Failed - 9/24/2019 12:03 PM        Failed - LFT or AST or ALT in last 12 months     Albumin   Date Value Ref Range Status    01/15/2015 4.3 3.5 - 5.0 g/dL Final     Bilirubin, Total   Date Value Ref Range Status   01/15/2015 0.5 0.0 - 1.0 mg/dL Final     Alkaline Phosphatase   Date Value Ref Range Status   01/15/2015 59 45 - 120 U/L Final     AST   Date Value Ref Range Status   01/15/2015 14 0 - 40 U/L Final     ALT   Date Value Ref Range Status   01/15/2015 11 0 - 45 U/L Final     Protein, Total   Date Value Ref Range Status   01/15/2015 7.5 6.0 - 8.0 g/dL Final                Failed - CBC w/ plts (Hm2) in last 12 months      WBC   Date Value Ref Range Status   02/15/2017 4.4 4.0 - 11.0 thou/uL Final     Hemoglobin   Date Value Ref Range Status   02/15/2017 15.7 12.0 - 16.0 g/dL Final     Hematocrit   Date Value Ref Range Status   02/15/2017 45.6 35.0 - 47.0 % Final     Platelets   Date Value Ref Range Status   02/15/2017 236 140 - 440 thou/uL Final             Passed - PCP or prescribing provider visit in last 12 months       Last office visit with prescriber/PCP: 3/7/2019 Susannah Davison MD OR same dept: 4/26/2019 Janie Garcia MD OR same specialty: 4/26/2019 Janie Garcia MD  Last physical: Visit date not found Last MTM visit: Visit date not found   Next visit within 3 mo: Visit date not found  Next physical within 3 mo: Visit date not found  Prescriber OR PCP: Susannah Davison MD  Last diagnosis associated with med order: 1. Routine general medical examination at a health care facility  - PARoxetine (PAXIL) 20 MG tablet; Take 1 tablet (20 mg total) by mouth daily.  Dispense: 30 tablet; Refill: 6    2. Light headedness  - PARoxetine (PAXIL) 20 MG tablet; Take 1 tablet (20 mg total) by mouth daily.  Dispense: 30 tablet; Refill: 6    3. Stress at home  - PARoxetine (PAXIL) 20 MG tablet; Take 1 tablet (20 mg total) by mouth daily.  Dispense: 30 tablet; Refill: 6    4. Seizure (H)  - divalproex (DEPAKOTE DR) 500 MG 12 hour tablet  Dispense: 180 tablet; Refill: 0    If protocol passes may refill for 12 months if within 3  months of last provider visit (or a total of 15 months).

## 2021-06-01 NOTE — TELEPHONE ENCOUNTER
Dr Davison received FMLA forms and stated that these were completed in March.  Is a new one really needed?     When pt returns call, please relay message to pt and then forward back to me, as I have the FMLA forms at my desk.    Thank you.

## 2021-06-01 NOTE — TELEPHONE ENCOUNTER
New Appointment Needed  What is the reason for the visit:    depression check  Provider Preference: Dr. Phillips or Dr Davison  How soon do you need to be seen?: next week anytime after 3pm  Waitlist offered?: No  Okay to leave a detailed message:  Yes

## 2021-06-01 NOTE — TELEPHONE ENCOUNTER
Please this patient and let her know that she needs to establish care with a new physician.  Dr. Tena has been gone for 9 months and she needs a new PCP.  Thanks.

## 2021-06-01 NOTE — TELEPHONE ENCOUNTER
I spoke to pt at clinic. She states she does not have health ins at this time. [facility] She will call to schedule est care with Dr. Phillips when her ins is active.

## 2021-06-01 NOTE — TELEPHONE ENCOUNTER
"I have a few \"reserved spots\" at or after 3pm next week. If Ana calls Sunday evening or Monday morning, those spots should become available and she can book them. Please let her know this.  "

## 2021-06-01 NOTE — TELEPHONE ENCOUNTER
Refill Request  Did you contact pharmacy: No  Medication name:   Requested Prescriptions     Pending Prescriptions Disp Refills     PARoxetine (PAXIL) 20 MG tablet 30 tablet 6     Sig: Take 1 tablet (20 mg total) by mouth daily.     divalproex (DEPAKOTE DR) 500 MG 12 hour tablet 180 tablet 0       The patient calls as will be losing insurance coverage at the end of September, and is requesting a 90 day supply of both these medication    Who prescribed the medication: JUDITH Davison  Pharmacy Name and Location: [place] [address]  Is patient out of medication: No.  several days days left  Patient notified refills processed in 72 hours:  yes  Okay to leave a detailed message: yes

## 2021-06-01 NOTE — TELEPHONE ENCOUNTER
L/m, unable to schedule her with Dr. Phillips or Laney for the times she had requested, is she willing to see someone else?     Are either of you able to see her? If yes, where can we squeeze pt in?

## 2021-06-02 VITALS — HEIGHT: 66 IN | WEIGHT: 202.75 LBS | BODY MASS INDEX: 32.59 KG/M2

## 2021-06-02 NOTE — TELEPHONE ENCOUNTER
Called and Left message for pt regarding her FMLA forms.  Authorization for Release of Medical Information needs to be signed by patient.  I left the form with .  Once signed, please return to me (GABI Narayan).    Thank you.

## 2021-06-03 VITALS
HEIGHT: 66 IN | SYSTOLIC BLOOD PRESSURE: 128 MMHG | BODY MASS INDEX: 32.3 KG/M2 | OXYGEN SATURATION: 99 % | DIASTOLIC BLOOD PRESSURE: 70 MMHG | HEART RATE: 98 BPM | WEIGHT: 201 LBS | RESPIRATION RATE: 16 BRPM

## 2021-06-03 VITALS — WEIGHT: 208 LBS | HEIGHT: 66 IN | BODY MASS INDEX: 33.43 KG/M2

## 2021-06-04 NOTE — TELEPHONE ENCOUNTER
Former patient of Lane[facility] & has not established care with another provider.  Please assign refill request to covering provider per Clinic standard process.      RN cannot approve Refill Request    RN can NOT refill this medication Protocol failed and NO refill given.       Hannah Phillips, Care Connection Triage/Med Refill 12/12/2019    Requested Prescriptions   Pending Prescriptions Disp Refills     divalproex (DEPAKOTE DR) 500 MG 12 hour tablet [Pharmacy Med Name: DIVALPROEX DELAYED RELEASE 500MG TB] 180 tablet 3     Sig: TAKE 1 TABLET(500 MG) BY MOUTH TWICE DAILY       Divalproex/Valproic Acid Refill Protocol Failed - 12/10/2019  8:02 PM        Failed - LFT or AST or ALT in last 12 months     Albumin   Date Value Ref Range Status   01/15/2015 4.3 3.5 - 5.0 g/dL Final     Bilirubin, Total   Date Value Ref Range Status   01/15/2015 0.5 0.0 - 1.0 mg/dL Final     Alkaline Phosphatase   Date Value Ref Range Status   01/15/2015 59 45 - 120 U/L Final     AST   Date Value Ref Range Status   01/15/2015 14 0 - 40 U/L Final     ALT   Date Value Ref Range Status   01/15/2015 11 0 - 45 U/L Final     Protein, Total   Date Value Ref Range Status   01/15/2015 7.5 6.0 - 8.0 g/dL Final                Failed - CBC w/ plts (Hm2) in last 12 months      WBC   Date Value Ref Range Status   02/15/2017 4.4 4.0 - 11.0 thou/uL Final     Hemoglobin   Date Value Ref Range Status   02/15/2017 15.7 12.0 - 16.0 g/dL Final     Hematocrit   Date Value Ref Range Status   02/15/2017 45.6 35.0 - 47.0 % Final     Platelets   Date Value Ref Range Status   02/15/2017 236 140 - 440 thou/uL Final             Passed - PCP or prescribing provider visit in last 12 months       Last office visit with prescriber/PCP: 9/30/2019 Janie Garcia MD OR same dept: 9/30/2019 Janie Garcia MD OR same specialty: 9/30/2019 Janie Garcia MD  Last physical: Visit date not found Last MTM visit: Visit date not found   Next visit within 3 mo: Visit date not found   Next physical within 3 mo: Visit date not found  Prescriber OR PCP: Janie Garcia MD  Last diagnosis associated with med order: 1. Seizure (H)  - divalproex (DEPAKOTE DR) 500 MG 12 hour tablet [Pharmacy Med Name: DIVALPROEX DELAYED RELEASE 500MG TB]; TAKE 1 TABLET(500 MG) BY MOUTH TWICE DAILY  Dispense: 180 tablet; Refill: 0    If protocol passes may refill for 12 months if within 3 months of last provider visit (or a total of 15 months).

## 2021-06-04 NOTE — TELEPHONE ENCOUNTER
HPI:   Ana Barrera is a 54 y.o. female c/o depression and anxiety.  Has been on paxil for at least several months.  Denies suicidal ideation  Recently terminated from job, losing insurance.  Has seizure disorder.  Has been having seizures about once a week.  She has missed work often and this is why she has been terminated.  She thinks it is related to not sleeping at night.  Has hot flashes and has been on premarin which is helping some.    Patient needs new doctor

## 2021-06-05 VITALS
BODY MASS INDEX: 34.25 KG/M2 | OXYGEN SATURATION: 96 % | TEMPERATURE: 98.6 F | HEART RATE: 80 BPM | RESPIRATION RATE: 16 BRPM | SYSTOLIC BLOOD PRESSURE: 122 MMHG | HEIGHT: 66 IN | DIASTOLIC BLOOD PRESSURE: 80 MMHG | WEIGHT: 213.12 LBS

## 2021-06-06 NOTE — TELEPHONE ENCOUNTER
Attempted call to schedule a physical appt with any provider.     There was no answer, LVM to call back. Did not leave detail message as to reason of call.     When patient returns call please inform she is over due for a physical and can address depression, and Pap smear as well as Hep C screenings. Thank you.

## 2021-06-08 NOTE — PROGRESS NOTES
OFFICE VISIT NOTE      Assessment & Plan   Ana Barrera is a 51 y.o. female with mental, emotional and physical distress. She has a complex situation going on and several factors will not go away soon.    High stress - multifactorial + she has no regular way to dissipate this. I have asked her to make de-stressing activity a high priority right now. I think she understands in her head the need for it, but she feels like doing it would be a luxury. She agreed to try paroxetine for her anxiety and stress, but she knows that will not help for a while.    Shingles - get through the pain, sleep, heal, follow.    Dizzy - stay hydrated, get needed sleep and destress - see if these help. Check labs.    Seizures - she has been driving on side streets in the day because she says her seizures come only at nights. I reminded her she is not to drive when her seizures are not controlled. She acknowledged that. It appears she has not seen a seizure specialist in years. She really needs to see a specialist for better control.    I wrote a work excuse for this week, but she might need more time --yet she might not have more time off work as she's taken FMLA for care of her grandson.    Diagnoses and all orders for this visit:    Light headedness  -     PARoxetine (PAXIL) 10 MG tablet; Take 1 tablet (10 mg total) by mouth daily.  Dispense: 30 tablet; Refill: 2  -     Discontinue: HYDROcodone-acetaminophen 5-325 mg per tablet; Take 1-2 tablets by mouth bedtime as needed for pain.  Dispense: 30 tablet; Refill: 0  -     HM1(CBC and Differential)  -     Basic Metabolic Panel  -     Thyroid Stimulating Hormone (TSH)  -     Vitamin D, Total (25-Hydroxy)  -     Glycosylated Hemoglobin A1c  -     Lipid Cascade  -     HM1 (CBC with Diff)  -     Discontinue: HYDROcodone-acetaminophen 5-325 mg per tablet; Take 1-2 tablets by mouth bedtime as needed for pain.  Dispense: 30 tablet; Refill: 0  -     HYDROcodone-acetaminophen 5-325 mg per  tablet; Take 1-2 tablets by mouth bedtime as needed for pain.  Dispense: 30 tablet; Refill: 0    Routine general medical examination at a health care facility  -     PARoxetine (PAXIL) 10 MG tablet; Take 1 tablet (10 mg total) by mouth daily.  Dispense: 30 tablet; Refill: 2  -     Discontinue: HYDROcodone-acetaminophen 5-325 mg per tablet; Take 1-2 tablets by mouth bedtime as needed for pain.  Dispense: 30 tablet; Refill: 0  -     HM1(CBC and Differential)  -     Basic Metabolic Panel  -     Thyroid Stimulating Hormone (TSH)  -     Vitamin D, Total (25-Hydroxy)  -     Glycosylated Hemoglobin A1c  -     Lipid Cascade  -     HM1 (CBC with Diff)  -     Discontinue: HYDROcodone-acetaminophen 5-325 mg per tablet; Take 1-2 tablets by mouth bedtime as needed for pain.  Dispense: 30 tablet; Refill: 0  -     HYDROcodone-acetaminophen 5-325 mg per tablet; Take 1-2 tablets by mouth bedtime as needed for pain.  Dispense: 30 tablet; Refill: 0    Stress at home  -     PARoxetine (PAXIL) 10 MG tablet; Take 1 tablet (10 mg total) by mouth daily.  Dispense: 30 tablet; Refill: 2  -     Discontinue: HYDROcodone-acetaminophen 5-325 mg per tablet; Take 1-2 tablets by mouth bedtime as needed for pain.  Dispense: 30 tablet; Refill: 0  -     HM1(CBC and Differential)  -     Basic Metabolic Panel  -     Thyroid Stimulating Hormone (TSH)  -     Vitamin D, Total (25-Hydroxy)  -     Glycosylated Hemoglobin A1c  -     Lipid Cascade  -     HM1 (CBC with Diff)  -     Discontinue: HYDROcodone-acetaminophen 5-325 mg per tablet; Take 1-2 tablets by mouth bedtime as needed for pain.  Dispense: 30 tablet; Refill: 0  -     HYDROcodone-acetaminophen 5-325 mg per tablet; Take 1-2 tablets by mouth bedtime as needed for pain.  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 female.     1) has shingles but denies any blisters opening. Still due to shingles she is not caring for her grandson (3mon old), her son is. This is so he does not get chicken pox. She is on valacyclovir. Her daughter (who had the baby 11/27/16) is in a locked unit in [facility] due to mental illness. She is struggling with being there and not caring for her baby. Ana has been using oxycodone before bed to sleep despite the pain. She says she does not like to take meds and rarely does. She is uncertain if her dizziness is due to a side effect of the med. She agrees to try hydrocodone instead.    2) stress - the situation with her daughter is hard, taking care of a young infant is hard, trying to keep a job is hard, having her son watch the baby is hard, and Ana does not feel like she should be upset/crying because she \"needs to be strong.\" h/o panic problems 15 years ago    3) seizures - she says these come only at night, but they do come. She fears with the current stressful situation, especially getting less sleep, she'll have more seizures. Initially she said she was not driving but later she clarified she drives during the day on side streets only. She does not go on the interstate.    Current Outpatient Prescriptions   Medication Sig     divalproex (DEPAKOTE) 500 MG EC tablet TAKE 1 TABLET BY MOUTH TWICE DAILY     omeprazole (PRILOSEC) 20 MG capsule TAKE 1 CAPSULE BY MOUTH TWICE DAILY     oxyCODONE (ROXICODONE) 5 MG immediate release tablet Take 1 tablet (5 mg total) by mouth every 6 (six) hours as needed for pain.     valACYclovir (VALTREX) 500 MG tablet Take 2 tablets (1,000 mg total) by mouth 3 (three) times a day.     HYDROcodone-acetaminophen 5-325 mg per tablet Take 1-2 tablets by mouth bedtime as needed for pain.     PARoxetine (PAXIL) 10 MG tablet Take 1 tablet (10 mg " "total) by mouth daily.       PSFHx: appropriate sections of PMH completed/filled in   Tobacco Status:  She  reports that she has never smoked. She does not have any smokeless tobacco history on file.    Review of Systems:  No fever.  No diarrhea or constipation.    Objective:      Visit Vitals     /70 (Patient Position: Standing)     Pulse 92     Temp 98.4  F (36.9  C) (Oral)     Resp 16     Ht 5' 6\" (1.676 m)  Comment: with shoes     Wt 177 lb 1.9 oz (80.3 kg)     BMI 28.59 kg/m2     GENERAL: crying sometimes, yet interacting appropriately under her circumstances  Mood: low  Judgment: fair to good  Insight: fair    Reviewed old notes  I see FMLA from 2015, too    Greater than 60 minutes spent with patient, with greater than 50% of time spent in counseling, consultation and care coordination regarding problems detailed above.          "

## 2021-06-08 NOTE — PROGRESS NOTES
Subjective:   Ana Barrera is a 51 y.o. female  Roomed by: Lelo      Chief Complaint   Patient presents with     Skin Problem     Right side of back, x 5 days   Started out with upper right sided back pain with burning sensation. Says she even went to her chiropractor for an adjustment. Then in the last 24 hours noticed a red bumpy rash on the same area.  Patient states patient denies any recent fever chills but says she hasn't been feeling well for the past 5 days.  Says it has been hard to sleep with the back pain/burning. Says she works at [employer]. 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  No results found.   Assessment - Plan   1. Shingles  - valACYclovir (VALTREX) 500 MG tablet; Take 2 tablets (1,000 mg total) by mouth 3 (three) times a day.  Dispense: 21 tablet; Refill: 0  - oxyCODONE (ROXICODONE) 5 MG immediate release tablet; Take 1 tablet (5 mg total) by mouth every 6 (six) hours as needed for  pain.  Dispense: 12 tablet; Refill: 0  Discussed with patient the nature of this infection and that people either have not had chickenpox or varicella vaccine are potentially able to get chickenpox virus.  Called patient waited for a call back from the pediatrician on call but after quite a while patient needed to leave.  After patient left was able to get a hold of Dr. Nichols who was on-call.  See patient instructions for the recommendations that she gave.  Called patient and discussed those recommendations with her.  Discussed the symptoms of the 2-month-old grandson for which he would need to be seen by his primary within 24 hours but not necessarily in the emergency department.  Discussed the patient that if she had any questions after our conversation to call our care connection number.    Over 30 minutes spent with the patient with greater than 50% spent discussing symptoms, treatment options, counseling and/or coordination of care.     At the conclusion of the encounter, assessment and plan were discussed.   All questions were answered.   The patient or guardian acknowledged understanding and was involved in the decision making regarding the overall care plan.    Patient Instructions   1. Keep well hydrated  2. If symptoms not improved after completing medication, follow up with primary  3. About caring for your 2 month old grandson -   Called patient the following recommendations from Dr. Nichols, the pediatrician on call   - Safest case is to not do any care giving of 2 month old grandson until the all of lesions are crusted over   - Keep affected area covered all sides with a gauze dressing   - Grandson has potentially already been exposed   - Watch for signs of infection - any rash, fever, increased fussiness or decreased oral intake and have grandson seen by his primary provider within 24 hours  4. If you have any questions, call the clinic number    What is shingles? -- Shingles is a painful rash that is  "shaped like a band or a belt. Shingles can affect people of all ages, but it is most common in those older than 50. Another name for shingles is \"herpes zoster.\"   What causes shingles? -- Shingles is caused by the same virus that causes chickenpox. After someone has chickenpox, the virus sometimes hides out, \"asleep\" in the body. Years later, it can \"wake up\" and cause shingles. The first time a person is infected with that virus, he or she gets chickenpox, not shingles.  Is shingles contagious? -- Yes and no. It is NOT possible to \"catch\" shingles from someone who has the rash. But it is possible to \"catch\" the virus and then get sick with chickenpox. Shingles and chickenpox are caused by the same virus.   You probably will NOT catch the virus (or get chickenpox) if you:   ?Had chickenpox or shingles in the past  ?Had the chickenpox vaccine  ?Were born before 1980 (most people born before 1980 have had chickenpox even if they don t remember it)   If you have never had chickenpox or the chickenpox vaccine, be careful around anyone with shingles. Do not touch their rash. If you do, you could get sick with chickenpox. In rare cases, people can even get chickenpox from just being near someone with shingles. This is most likely in people who cannot fight infections well.   What are the symptoms of shingles? -- At first, shingles causes weird sensations on your skin. You might feel itching, burning, pain or tingling. Some people get a fever, feel sick, or get a headache. Within 1 to 2 days, a rash with blisters appears. Blisters most often appear in a band across the chest and back. They can show up on other parts of the body, too. The blisters cause pain that can be mild or severe.  Within 3 to 4 days, shingles blisters can become open sores or \"ulcers\". These ulcers can get infected. Within 7 to 10 days, the rash should scab over. By then, most people are no longer contagious.  Can shingles be serious? -- Yes. " "Shingles can be serious, but that is rare. About 1 out of 10 people with shingles will get something called \"postherpetic neuralgia,\" or \"PHN.\" People with PHN keep feeling pain or discomfort even after their rash goes away. This pain can last for months or even years. It can be so bad that it makes it hard to sleep, causes weight loss, and leads to depression.  Shingles can also cause:  ?Skin infections  ?Eye problems (if the rash is near the eye)   ?Ear problems (if the rash is near the ear)  ?Dangerous infections in people who have other health problems  Should I be treated? -- If you see your doctor or nurse within 3 days of when your symptoms start, yes. He or she should give you medicines to help you get rid of the virus. These medicines are called anti-virals. They can speed your recovery and reduce the chances that you will have shingles-related problems such as PHN.  Can the pain be treated? -- Some people can deal with their pain with non-prescription pain medicines, but most people need prescription medicines.   How should I take care of the rash? -- Keep the parts of your skin that have a rash clean and dry. Do not use creams or gels unless your doctor or nurse says you should.   Can shingles be prevented? -- People can reduce the chances of getting shingles by having the shingles vaccine. The vaccine can also make the symptoms of shingles milder if they do occur. Most people age 60 and older should get the shingles vaccine.                            "

## 2021-06-09 NOTE — PROGRESS NOTES
OFFICE VISIT NOTE      Assessment & Plan   Ana Barrera is a 51 y.o. female feeling even worse than last time, however her shingles and shingles pain is IMPROVING. This is fabulous. Still her anxiety is no better - she says she feels it heavily constantly. She does nothing to help it other than mall walking at times. She tried to return to work yesterday morning but felt too light-headed as she drove into work. Thus, she continues to drive.  She begs for something to help her calm down - and I agreed to have her try diazepam for a short time. I told her it would help but that it was ONLY a bridge until other medication and therapies could help. I explained that one prescription should last at least a couple weeks and hopefully longer. She is to take 1/2 - 1 tab every 8 hrs only with the worst anxiety.  She really must see a neurologist about her seizures.  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  Dispense: 30 tablet; Refill: 0      Yamilet Phillips MD              Subjective:   Chief Complaint:  Follow-up (c/o dizziness and nausea is not better and very anxious)    51 y.o. female.     1) worse -  So anxious she can't think straight.  Shingles - pain decreased; just taking tylenol and ibuprofen now for the pain and doing fine with that.  Only took hydrocodone once, 4 days ago    Still has nausea  No appetite  Yesterday was terrible - anxious all day    Some new info on her daughter - she had to be put in restraints  Her daughter tells her mom she wants to come home and be with her son + the only time she feels OK is when she's asleep.  Tried to go to work yesterday, could not do it - felt like she was going to pass out. So she went home and walked to her son's house.    Took paxil  "2 days and quit due to hightened anxiety.    depakote - cut in half a year ago; never returned to the normal dose.  It was hard to come in here    Sleep is interrupted  Tired all day    Anxious ABOUT -   Last time she felt normal - yesterday felt a little OK    Current Outpatient Prescriptions   Medication Sig     divalproex (DEPAKOTE) 500 MG EC tablet TAKE 1 TABLET BY MOUTH TWICE DAILY     HYDROcodone-acetaminophen 5-325 mg per tablet Take 1-2 tablets by mouth bedtime as needed for pain.     omeprazole (PRILOSEC) 20 MG capsule TAKE 1 CAPSULE BY MOUTH TWICE DAILY     PARoxetine (PAXIL) 10 MG tablet Take 1 tablet (10 mg total) by mouth daily.     diazePAM (VALIUM) 2 MG tablet Take 1 tablet (2 mg total) by mouth every 8 (eight) hours as needed for anxiety.     oxyCODONE (ROXICODONE) 5 MG immediate release tablet Take 1 tablet (5 mg total) by mouth every 6 (six) hours as needed for pain.     valACYclovir (VALTREX) 500 MG tablet Take 2 tablets (1,000 mg total) by mouth 3 (three) times a day.       PSFHx: appropriate sections of PMH completed/filled in   Tobacco Status:  She  reports that she has never smoked. She does not have any smokeless tobacco history on file.    Review of Systems:  No fever.  No new rash.    Objective:      Visit Vitals     /80 (Patient Site: Left Arm, Patient Position: Sitting, Cuff Size: Adult Regular)     Pulse 96     Temp 98.7  F (37.1  C) (Oral)     Resp 20     Ht 5' 6\" (1.676 m)     Wt 173 lb 8 oz (78.7 kg)     Breastfeeding No     BMI 28 kg/m2     GENERAL: No acute respiratory distress, but clearly in emotional distress, yet dressed nicely with makeup on; tears through most of the visit (appropriate)  Skin: on her right back there is a small patch of erythematous base with small crusted blisters (about 10) over the scapula; under the right axilla is a second patch, more erythematous with about 15-20 blisters, all but 2 are crusted; none bleeding    Mood: very tense, anxious  Judgment: " poor - fair  Insight: poor - fair    Greater than 40 minutes spent with patient, with greater than 50% of time spent in counseling, consultation and care coordination regarding problems detailed above.

## 2021-06-10 NOTE — PROGRESS NOTES
Subjective:   Ana comes in today for follow-up of her seizure disorder.  She continues to have seizures.  These usually come on at nighttime.  She has not seen a neurologist in quite a while for follow-up of her seizure disorder.  She takes Depakote twice daily.  She has not had a Depakote level recently.  She otherwise has been feeling fairly well.  She would like a refill of her Paxil.  She needs forms filled out for her work.  These are [facility] forms.  Her Depakote level is 500 mg twice daily.  She denies side effects to this medication.       Objective:   HEENT: Pupils equally round and reactive to light.  Pharynx clear.  Neck: No thyromegaly noted.  No bruits heard.  Lungs: Lungs are clear.  Patient's in no respiratory distress.  Cardiac: There is a regular rhythm present.  No murmur heard.  Neurologic: Cranial nerves all appear intact.  Patient is in no distress of any type.  Motor strength appears normal.  Gait is normal.  Sensory exam normal. Patient is alert and oriented.  She answers all questions without problem.        Assessment:  1.  Seizure disorder.  Patient continues to have seizures once or twice a month.      Plan:  I encouraged the patient to follow-up with neurology for evaluation of her seizures as there are other medications which may control her seizures little better.  Depakote level will be drawn today.  Hepatic panel also will be drawn.  The patient will be called with any abnormalities.  I encouraged her to get adequate sleep.  Encouraged her to hydrate well and eat a routine diet and well-balanced diet.  She will try to limit stress in her life as well.  All of these factors could affect seizure frequency.  Forms are filled out for her today.  She will follow-up here as needed.

## 2021-06-12 NOTE — PROGRESS NOTES
Ana was seen today for medication refills and back pain.    Diagnoses and all orders for this visit:    Moderate episode of recurrent major depressive disorder (H)  -     PARoxetine (PAXIL) 20 MG tablet; Take 2 tablets (40 mg total) by mouth daily.    Her depression is worse due to being unemployed and COVID.  She would like an increase in her medication.  I explained that a higher dose of this medication would not give her any added benefit. She will continue this and add Abilify 2.5 mg daily.  I will have staff call her in one week and see how she is doing with this medication - she will let me know before then if she has any side effects.      She also has been drinking more alcohol beucase of her depression.  I explained that this can interfere with her sleep. And recommended that she cut down to no more than two glasses a day (currently drinking a bottle of wine every evening.  She thinks that she can cut down and is not yet to a place where her drinking is a problem.  I recommend that she cut down or stop right away before it does become a problem.   Also recommended that she try to get in physical activity every day to help with her physical and mental health.     Seizure (H)- less frequent since being laid off work, thinks it's because she has less stress.  She has been not taking her meds as prescribed because she didn't have enough and couldn't get more from the pharmacy until she was seen.   -     divalproex (DEPAKOTE DR) 500 MG 12 hour tablet; Take 500 mg twice daily    Other insomnia- related to depression.  She was on gabapentin in the past for back pain and it made her very sleepy.  Will try at bedtime for sleep and possible help with depression.    -     gabapentin (NEURONTIN) 100 MG capsule; Take one tablet at bedtime for a week, then increase to two tablets at bedtime.  After one week, increase to 3 tablets at bedtime if needed for sleep and if no side effects.    RTC 2 weeks for follow up, sooner  prn.  Needs PCP, I recommended Dr. Ferrer and she will schedule with her for a physical.     40 min visit, over 50% of visit spent in counseling and education about the above issues.           HPI:  Pt is here today to because she needs refills on her seizure meds and couldn't get refills until she was seen.  Her depression is getting worse, has been out of work for several months.  Sleeps poorly and then is tired all day long.  Sometimes sleep in the daytime. Getting intermittent physical activity.  Has been drinking a bottle of wine every night. No thoughts of wanting to harm herself or others.        Has only had one or two seizures in the past couple months.  Thinks it's because she is not working and has less stress.      Eating OK.  Has enough food at home.        Needs a PCP. Had les Hernandez.  Is due for a preventive visit.      History reviewed.   Meds:  Reviewed and updated     Patient is in no apparent physical distress.  Vitals are as recorded.  Head and face are normal.  Conjunctiva are clear.  Neck is without adenopathy or masses.  Cardiovascular :  Regular rate and rhythm with no murmurs.  Lungs are clear with good air movement bilaterally.  Extremities are without edema.  Gait is normal.  Skin is without rashes.  Mood and affect are appropriate.

## 2021-06-16 PROBLEM — G47.09 OTHER INSOMNIA: Status: ACTIVE | Noted: 2019-03-07

## 2021-06-16 PROBLEM — N95.1 MENOPAUSAL SYNDROME (HOT FLASHES): Status: ACTIVE | Noted: 2019-03-07

## 2021-06-16 PROBLEM — F33.1 MODERATE EPISODE OF RECURRENT MAJOR DEPRESSIVE DISORDER (H): Status: ACTIVE | Noted: 2019-03-07

## 2021-06-16 PROBLEM — R53.83 OTHER FATIGUE: Status: ACTIVE | Noted: 2019-03-07

## 2021-06-17 NOTE — PROGRESS NOTES
OFFICE VISIT NOTE      Assessment & Plan   Ana Barrera is a 52 y.o. female with multiple medical problems, struggling now with dizziness.While I am not sure what the etiology of her dizziness is, I do know she is not taking her seizures seriously and does not seem to want to. I strongly encouraged her to make an appointment with a neurologist but she declined today. She noted that being on paroxetine seems to help her mood, and she's glad. She's glad to increase from 10 to 20mg.  She will try to see if a PT at her work can get her in for the maneuvers to help benign positional vertigo. She will take more meclizine to see if that controls her dizziness more. She'll take a 1/2 or 1 diazepam to see if that helps, too. Mostly she'll rest and hope this resolves.    She had a lot of stress going on the last time I saw her --her own stress and stress with caring for her daughters. Those situations are improved at this time.      Diagnoses and all orders for this visit:    Routine general medical examination at a health care facility  -     PARoxetine (PAXIL) 20 MG tablet; Take 1 tablet (20 mg total) by mouth daily.  Dispense: 30 tablet; Refill: 5    Light headedness  -     PARoxetine (PAXIL) 20 MG tablet; Take 1 tablet (20 mg total) by mouth daily.  Dispense: 30 tablet; Refill: 5    Stress at home  -     PARoxetine (PAXIL) 20 MG tablet; Take 1 tablet (20 mg total) by mouth daily.  Dispense: 30 tablet; Refill: 5    Other orders  -     meclizine (ANTIVERT) 25 mg tablet; Take 2 tablets (50 mg total) by mouth 3 (three) times a day as needed.  Dispense: 90 tablet; Refill: 1  -     diazePAM (VALIUM) 2 MG tablet; Take 1/2 -1 tab by mouth daily as needed for severe dizziness  Dispense: 5 tablet; Refill: 0        Yamilet Phillips MD    The following high BMI interventions were performed this visit: encouragement to exercise          Subjective:   Chief Complaint:  Dizziness    52 y.o. female.     1) very dizzy - worse today  "- hard to put make up on  Any movement makes it worse  Sitting here, still, it was OK  Took meclizine this AM; worked better    2) she has occasional seizures; she's not seen a seizure doctor/neurologist in over a year. She says Dr. Tena has prescribed her medication. She chose to decrease her depakote from twice daily to once daily. She's not sure why she started to do that --perhaps because she was low on medication. She has no plans to see a seizure doctor now.    Current Outpatient Prescriptions   Medication Sig     divalproex (DEPAKOTE) 500 MG EC tablet Take 1 tablet (500 mg total) by mouth 2 (two) times a day.     meclizine (ANTIVERT) 25 mg tablet Take 2 tablets (50 mg total) by mouth 3 (three) times a day as needed.     PARoxetine (PAXIL) 20 MG tablet Take 1 tablet (20 mg total) by mouth daily.     diazePAM (VALIUM) 2 MG tablet Take 1/2 -1 tab by mouth daily as needed for severe dizziness     omeprazole (PRILOSEC) 20 MG capsule TAKE 1 CAPSULE BY MOUTH TWICE DAILY     oxyCODONE (ROXICODONE) 5 MG immediate release tablet Take 1 tablet (5 mg total) by mouth every 6 (six) hours as needed for pain.     valACYclovir (VALTREX) 500 MG tablet Take 2 tablets (1,000 mg total) by mouth 3 (three) times a day.       PSFHx: appropriate sections of PMH completed/filled in   Tobacco Status:  She  reports that she is a non-smoker but has been exposed to tobacco smoke. She has never used smokeless tobacco.    Review of Systems:  No fever.  No rash.    Objective:    /84  Pulse 84  Temp 98.7  F (37.1  C) (Oral)   Resp 16  Ht 5' 6\" (1.676 m)  Wt 199 lb (90.3 kg)  Breastfeeding? No  BMI 32.12 kg/m2  GENERAL: No acute distress.  HEENT: PERRL, EOMI but NO lateral nystagmus; TMs clear, throat without erythema or exudate  NECK: supple, no LA  Ht: reg s1s2  Lungs: clear    Spent 40 min face to face with patient with more the 50% spent in counseling, reviewing chart, and coordination of care and discussing seizures and " medications for seizures + side effects; discussed options to treat dizziness including meds and PT.

## 2021-06-19 NOTE — LETTER
Letter by Janie Garcia MD at      Author: Janie Garcia MD Service: -- Author Type: --    Filed:  Encounter Date: 10/22/2019 Status: Signed        [facility] PATIENT ACCESS  [address] [place] [address] [address]  [facility] [id number]  [phone]         Ana Barrera  [address] [place] [address]        10/22/19    Dear Ana Barrera,     At [facility] we care about your health and well-being. Your primary care provider is committed to ensuring you receive high quality care and has chosen a network of specialists to assist in providing that care. Recently Dr. Garcia referred you to Neurology for specialty care.      Please call [facility] at [phone] at your earliest convenience for assistance in scheduling an appointment.  If you have already scheduled this appointment, please disregard this notice.  Thank you for choosing [facility] for your healthcare needs.       Sincerely,       [facility] Specialty Scheduling

## 2021-06-20 NOTE — LETTER
Letter by Christian Ruiz at      Author: Christian Riuz Service: -- Author Type: --    Filed:  Encounter Date: 7/1/2020 Status: (Other)         Ana Barrera  Paper To Sign/brow Folder  [address] [place] [address]                     July 1, 2020    Dear Ana:    At the [facility], we care about you and your health. When diagnosed early, many diseases and illness can be treated and possibly prevented. That's why it is important to see your primary care provider regularly for routine examinations and to maintain your overall health.We are trying to contact you to ensure you receive the best level of care. Our records show that you are overdue for a physical.  Please contact our office at [phone] to schedule an appointment.  If you are receiving care elsewhere, please contact us so that we can update our records.   Thank you for trusting us with your care.       If you have any questions or concerns, please don't hesitate to call.    Sincerely,  [facility] Staff          Electronically signed by No Primary Care Provider

## 2021-06-23 NOTE — TELEPHONE ENCOUNTER
Former patient of [facility] & has not established care with another provider.  Please assign refill request to covering provider per Clinic standard process.      Refill Approved    Rx renewed per Medication Renewal Policy. Medication was last renewed on 5/4/18.    Hannah Phillips, [facility] Triage/Med Refill 1/15/2019     Requested Prescriptions   Pending Prescriptions Disp Refills     PARoxetine (PAXIL) 20 MG tablet [Pharmacy Med Name: PAROXETINE 20MG TABLETS] 30 tablet 0     Sig: TAKE 1 TABLET(20 MG) BY MOUTH DAILY    SSRI Refill Protocol  Passed - 1/15/2019  3:51 AM       Passed - PCP or prescribing provider visit in last year    Last office visit with prescriber/PCP: 5/4/2018 Yamilet Phillips MD OR same dept: 5/4/2018 Yamilet Phillips MD OR same specialty: 5/4/2018 Yamilet Phillips MD  Last physical: Visit date not found Last MTM visit: Visit date not found   Next visit within 3 mo: Visit date not found  Next physical within 3 mo: Visit date not found  Prescriber OR PCP: Yamilet Phillips MD  Last diagnosis associated with med order: 1. Routine general medical examination at a health care facility  - PARoxetine (PAXIL) 20 MG tablet [Pharmacy Med Name: PAROXETINE 20MG TABLETS]; TAKE 1 TABLET(20 MG) BY MOUTH DAILY  Dispense: 30 tablet; Refill: 0    2. Light headedness  - PARoxetine (PAXIL) 20 MG tablet [Pharmacy Med Name: PAROXETINE 20MG TABLETS]; TAKE 1 TABLET(20 MG) BY MOUTH DAILY  Dispense: 30 tablet; Refill: 0    3. Stress at home  - PARoxetine (PAXIL) 20 MG tablet [Pharmacy Med Name: PAROXETINE 20MG TABLETS]; TAKE 1 TABLET(20 MG) BY MOUTH DAILY  Dispense: 30 tablet; Refill: 0    If protocol passes may refill for 12 months if within 3 months of last provider visit (or a total of 15 months).

## 2021-06-24 NOTE — TELEPHONE ENCOUNTER
The patient would like to see Dr. Phillips but does not wish to schedule at this time. She states that she must check her work schedule.

## 2021-06-24 NOTE — TELEPHONE ENCOUNTER
The patient is listing Dr. Tena as a primary care provider. Dr. Tena retired from clinic practice December 2018. The patient is due to establish care with a new provider. The writer intends to contact the patient to assist with the scheduling process. If the patient has established care elsewhere, please discontinue [facility] as the primary physician and close.

## 2021-06-24 NOTE — TELEPHONE ENCOUNTER
[facility] paperwork was successfully faxed. Copy scanned to medical records.

## 2021-06-24 NOTE — PROGRESS NOTES
Assessment/Plan:        Diagnoses and all orders for this visit:    Seizure (H)- seizure disorder since early childhood, increased frequency of seizures and duration of post-ictal state. Sleep deprivation and stress may be contributing factors.  I recommended she see a neurologist and she does not want to do that.  Thinks her seizures will get better if her sleep gets better. [facility] forms and letter from [facility] completed and faxed.   -     divalproex (DEPAKOTE DR) 500 MG 12 hour tablet; Take 1 tablet (500 mg total) by mouth 2 (two) times a day.  Dispense: 180 tablet; Refill: 0    Moderate episode of recurrent major depressive disorder (H)- continue current medication, it is helping some.  Explained that it would be beneficial for her to get a PCP and have regular visits.     Symptomatic menopausal or female climacteric states- explained pros and cons of treatment, discussed cancer risk with hormones.  RTC 3 months for follow up, sooner prn.   -     estrogens, conjugated, (PREMARIN) 0.625 MG tablet; Take 1 tablet (0.625 mg total) by mouth daily.  Dispense: 30 tablet; Refill: 11    Menopausal syndrome (hot flashes)- soul improve with estrogen.     Other insomnia- secondary to hot flashes    Other fatigue- I recommended routine blood tests since she hasn't had them done in a couple years.  She said she will do them at another time.   -     Thyroid Cascade; Future  -     Hemoglobin; Future    Stress at home- adult daughter has mental illness and she and her children are living with the pt. May be contributing to her insomnia.      RTC 3 months for follow up with PCP, sooner prn.     Subjective:    Patient ID: Ana Barrera is a 53 y.o. female.    HPI:  Here today because she wants her [facility] paperwork updated.  She has been having more seizures than she has bad in the past and has missed a lot of work this year because of it.  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  Her seizures are related  to sleep, happen in her sleep.  She used to feel better the next day, now it takes her 2-3 days to recover.   Is taking the same dose of Depakote that she has been on for a long time.  Doesn't want to see the neurologist.       She has been having hot flashes that interfere with her sleep.  Lack of sleep is one of the triggers for her seizures   Is under a lot of stress at home.  Has her grandchildren and daughter living with her.  Her daughter has mental illness and has not been doing well.    Someone told her that there is estrogen cream that you can put on your skin to help with hot flashes and she is interested in that.  Is worried about getting cancer from taking estrogen and wants to know what treatments there are for menopausal sx's.  Thinks that if she starts sleeping better that her seizures will decrease in frequency back to her usual.     The following portions of the patient's history were reviewed and updated as appropriate: allergies, current medications, past family history, past medical history, past social history, past surgical history and problem list.    Review of Systems      12 sys rev neg other than HPI    Objective:    Physical Exam       Patient is in no apparent physical distress.  Vitals are as recorded.  Head and face are normal.  Conjunctiva are clear.  Neck is without adenopathy or masses.thyroid not enlarged.  Cardiovascular :  Regular rate and rhythm with no murmurs.  Lungs are clear with good air movement bilaterally.  Extremities are without edema. Neuro grossly intact.  Gait is normal.  Skin is without rashes.    Flat affect.

## 2021-06-25 NOTE — PROGRESS NOTES
Progress Notes by Tara Olmstead PA-C at 10/5/2017  2:30 PM     Author: Tara Olmstead PA-C Service: -- Author Type: Physician Assistant    Filed: 10/5/2017  3:07 PM Encounter Date: 10/5/2017 Status: Signed    : Tara Olmstead PA-C (Physician Assistant)       Subjective:      Patient ID: Ana Barrera is a 52 y.o. female.    Chief Complaint:    HPI Ana Barrera is a 52 y.o. female with PMH of grand mal seizure disorder who presents today complaining of dizziness, HA, and nausea. She woke up with a HA this morning, but no dizziness. She took Advil for the HA and it went away. Patient reports that today she was doing a workout on the floor, when she stood up she suddenly felt dizzy and nausea. She felt that the room was spinning and she felt that she could not walk straight because of the spinning. She is still having the dizziness. She reports that quick movements of her head worsen her symptoms. Her dizziness is tolerable when she sits still. She denies any fever, sore throat, ear pain. She thought there may be a mild ear echo/ringing this morning, but not so much now. She denies any LOC. She denies ever having these symptoms before. She usually has siezures at night, usually her  tells her if she has them, but she does ever have these symptoms after them. She has siezures a couple of times per month. She denies any vision changes.         Social History   Substance Use Topics   ? Smoking status: Never Smoker   ? Smokeless tobacco: Never Used   ? Alcohol use Not on file       Review of Systems   Constitutional: Negative for fever.   HENT: Positive for tinnitus. Negative for sore throat.    Eyes: Negative for visual disturbance.   Gastrointestinal: Positive for nausea. Negative for vomiting.   Neurological: Positive for dizziness and headaches. Negative for syncope and weakness.       Objective:     There were no vitals taken for this visit.    Physical Exam   Constitutional: She appears  well-developed and well-nourished. No distress.   HENT:   Head: Normocephalic and atraumatic.   Right Ear: Tympanic membrane and external ear normal.   Left Ear: Tympanic membrane and external ear normal.   Eyes: Conjunctivae are normal. Pupils are equal, round, and reactive to light. Right eye exhibits no discharge. Left eye exhibits no discharge. Right eye exhibits nystagmus. Left eye exhibits nystagmus.   Cardiovascular: Normal rate, regular rhythm and normal heart sounds.  Exam reveals no gallop and no friction rub.    No murmur heard.  Pulmonary/Chest: Effort normal and breath sounds normal. No respiratory distress. She has no wheezes. She has no rales.   Neurological: Gait abnormal.   Patient walked carefully grabbing on to things to keep her balance. Positive [place] Hallpike maneuver.    Skin: She is not diaphoretic.   Psychiatric: She has a normal mood and affect. Her behavior is normal. Judgment and thought content normal.   Nursing note and vitals reviewed.    Clinical Decision Making:  Considering her nell hallpike was positive I suspect a BPPV. She is not exhibiting other neurologic findings other than dizziness and nystagmus. There were no signs of infection seen on ear exam today. Possibly being caused by Ménière's disease considering the patient has an echoing sensation with her hearing.  She denies any recent URI symptoms, but she does chronically have a runny nose.  She was treated symptomatically with Antivert and referred to ENT for further evaluation and possibly physical therapy for BPPV treatment. At the end of the encounter, I discussed results, diagnosis, medications. Discussed red flags for immediate return to clinic/ER, as well as indications for follow up if no improvement. Patient understood and agreed to plan. Patient was stable for discharge.    Assessment:     Procedures    1. Vertigo  Ambulatory referral to ENT    meclizine (ANTIVERT) 25 mg tablet         Patient Instructions     1. Take  Antivert 25 mg up to three times daily as needed for dizziness.   2. Follow up with ENT to discuss therapies for this likely BPPV.   3. Increase fluids and rest.   4. This can last for a few weeks. My hope is that the Antivert will make your symptoms more manageable.   Possible Causes of Dizziness or Fainting  Dizziness and fainting can have many causes. Below are some examples of possible causes your healthcare provider will look to rule out.  Benign paroxysmal positional vertigo (BPPV)  BPPV results when calcium crystals inside the inner ear shift into the wrong position. BPPV causes episodes of vertigo (a spinning sensation). Episodes most often happen when the head is moved in a certain way. This is more common in people 65 and older.   Infection or inflammation  The semicircular canals of the ear may become infected or inflamed. In this case, they can send the wrong balance signals. This can cause vertigo.  Meniere disease  Meniere disease happens when there is too much fluid in the semicircular canals. This can cause vertigo. It also can cause hearing problems and buzzing or ringing in the ears (called tinnitus). You may also have a feeling of pressure or fullness in the ear.    Benign Paroxysmal Positional Vertigo  Benign paroxysmal positional vertigo (BPPV) is a problem with the inner ear. The inner ear contains the vestibular system. This system is what helps you keep your balance. BPPV causes a feeling of spinning. It is a common problem of the vestibular system.  Understanding the vestibular system  The vestibular system of the ear is made up of very tiny parts. They include the utricle, saccule, and semicircular canals. The utricle is a tiny organ that contains calcium crystals. In some people, the crystals can move into the semicircular canals. When this happens, the system no longer works as it should. This causes BPPV. Benign means it is not life-threatening. Paroxysmal means it happens suddenly.  Positional means that it happens when you move your head. Vertigo is a feeling of spinning.  What causes BPPV?  Causes include injury to your head or neck. Other problems with the vestibular system may cause BPPV. In many people, the cause of BPPV is not known.  Symptoms of BPPV  You many have repeated feelings of spinning (vertigo). The vertigo usually lasts less than 1 minute. Some movements, suchas rolling over in bed, can bring on vertigo.  Diagnosing BPPV  Your primary health care provider may diagnose and treat your BPPV. Or you may see an ear, nose, and throat doctor (otolaryngologist). In some cases, you may see a nervous system doctor (neurologist).  The health care provider will ask about your symptoms and your medical history. He or she will examine you. You may have hearing and balance tests. As part of the exam, your health care provider may have you move your head and body in certain ways. If you have BPPV, the movements can bring on vertigo. Your provider will also look for abnormal movements of your eyes. You may have other tests to check your vestibular or nervous systems.  Treatment for BPPV  Your health care provider may try to move the calcium crystals. This is done by having you move your head and neck in certain ways. This treatment is safe and often works well. You may also be told to do these movements at home. You may still have vertigo for a few weeks. Your health care provider will recheck your symptoms, usually in about a month. Special physical therapy may also be part of treatment.  In rare cases surgery may be needed for BPPV that does not go away.     When to call the health care provider  Call your health care provider right away if you have any of these:    Symptoms that do not go away with treatment    Symptoms that get worse    New symptoms   Date Last Reviewed: 3/19/2015    5109-7694 [employer]. [address] [place] [address], [place] [address]. All rights reserved. This  information is not intended as a substitute for professional medical care. Always follow your healthcare professional's instructions.

## 2021-09-17 ENCOUNTER — OFFICE VISIT (OUTPATIENT)
Dept: FAMILY MEDICINE | Facility: CLINIC | Age: 56
End: 2021-09-17
Payer: COMMERCIAL

## 2021-09-17 VITALS
RESPIRATION RATE: 16 BRPM | DIASTOLIC BLOOD PRESSURE: 82 MMHG | WEIGHT: 208.06 LBS | BODY MASS INDEX: 33.84 KG/M2 | SYSTOLIC BLOOD PRESSURE: 140 MMHG | TEMPERATURE: 98.2 F | HEART RATE: 92 BPM | OXYGEN SATURATION: 98 %

## 2021-09-17 DIAGNOSIS — R19.7 DIARRHEA, UNSPECIFIED TYPE: Primary | ICD-10-CM

## 2021-09-17 PROCEDURE — 99213 OFFICE O/P EST LOW 20 MIN: CPT | Performed by: EMERGENCY MEDICINE

## 2021-09-17 RX ORDER — GABAPENTIN 100 MG/1
CAPSULE ORAL
COMMUNITY
Start: 2021-06-26 | End: 2022-09-20

## 2021-09-17 RX ORDER — LOPERAMIDE HYDROCHLORIDE 2 MG/1
2 TABLET ORAL 4 TIMES DAILY PRN
Qty: 20 TABLET | Refills: 1 | Status: SHIPPED | OUTPATIENT
Start: 2021-09-17 | End: 2023-12-28

## 2021-09-17 NOTE — PATIENT INSTRUCTIONS
Patient Education     Diarrhea with Uncertain Cause (Adult)    Diarrhea is when stools are loose and watery. This can be caused by:    Viral infections    Bacterial infections    Food poisoning    Parasites    Irritable bowel syndrome (IBS)    Inflammatory bowel diseases such as ulcerative colitis, Crohn's disease, and celiac disease    Food intolerance, such as to lactose, the sugar found in milk and milk products    Reaction to medicines like antibiotics, laxatives, cancer drugs, and antacids  Along with diarrhea, you may also have:    Abdominal pain and cramping    Nausea and vomiting    Loss of bowel control    Fever and chills    Bloody stools  In some cases, antibiotics may help to treat diarrhea. You may have a stool sample test. This is done to see what is causing your diarrhea, and if antibiotics will help treat it. The results of a stool sample test may take up to 2 days. The healthcare provider may not give you antibiotics until he or she has the stool test results.  Diarrhea can cause dehydration. This is the loss of too much water and other fluids from the body. When this occurs, body fluid must be replaced. This can be done with oral rehydration solutions. Oral rehydration solutions are available at drugstores and grocery stores without a prescription. Sports drinks are not the best choice if you are very dehydrated. They have too much sugar and not enough electrolytes.  Home care  Follow all instructions given by your healthcare provider. Rest at home for the next 24 hours, or until you feel better. Avoid caffeine, tobacco, and alcohol. These can make diarrhea, cramping, and pain worse.  If taking medicines:    Over-the-counter nausea and diarrhea medicines are generally OK unless you experience fever or blood stool. Check with your doctor first in those circumstances.    You may use acetaminophen or NSAID medicines like ibuprofen or naproxen to reduce pain and fever. Don t use these if you have  chronic liver or kidney disease, or ever had a stomach ulcer or gastrointestinal bleeding. Don't use NSAID medicines if you are already taking one for another condition (like arthritis) or are on daily aspirin therapy (such as for heart disease or after a stroke). Talk with your healthcare provider first.    If antibiotics were prescribed, be sure you take them until they are finished. Don t stop taking them even when you feel better. Antibiotics must be taken as a full course.  To prevent the spread of illness:    Remember that washing with soap and water and using alcohol-based  is the best way to prevent the spread of infection. Dry your hands with a single use towel (like a paper towel).    Clean the toilet after each use.    Wash your hands before eating.    Wash your hands before and after preparing food. Keep in mind that people with diarrhea or vomiting should not prepare food for others.    Wash your hands after using cutting boards, countertops, and knives that have been in contact with raw foods.    Wash and then peel fruits and vegetables.    Keep uncooked meats away from cooked and ready-to-eat foods.    Use a food thermometer when cooking. Cook poultry to at least 165 F (74 C). Cook ground meat (beef, veal, pork, lamb) to at least 160 F (71 C). Cook fresh beef, veal, lamb, and pork to at least 145 F (63 C).    Don t eat raw or undercooked eggs (poached or flora side up), poultry, meat, or unpasteurized milk and juices.  Food and drinks  The main goal while treating vomiting or diarrhea is to prevent dehydration. This is done by taking small amounts of liquids often.    Keep in mind that liquids are more important than food right now.    Drink only small amounts of liquids at a time.    Don t force yourself to eat, especially if you are having cramping, vomiting, or diarrhea. Don t eat large amounts at a time, even if you are hungry.    If you eat, avoid fatty, greasy, spicy, or fried  foods.    Don t eat dairy foods or drink milk if you have diarrhea. These can make diarrhea worse.  During the first 24 hours you can try:    Oral rehydration solutions.  Sports drinks may be used if you are not too dehydrated and are otherwise healthy.    Soft drinks without caffeine    Ginger ale    Water (plain or flavored)    Decaf tea or coffee    Clear broth, consommé, or bouillon    Gelatin, popsicles, or frozen fruit juice bars  The second 24 hours, if you are feeling better, you can add:    Hot cereal, plain toast, bread, rolls, or crackers    Plain noodles, rice, mashed potatoes, chicken noodle soup, or rice soup    Unsweetened canned fruit (no pineapple)    Bananas  As you recover:    Limit fat intake to less than 15 grams per day. Don t eat margarine, butter, oils, mayonnaise, sauces, gravies, fried foods, peanut butter, meat, poultry, or fish.    Limit fiber. Don t eat raw or cooked vegetables, fresh fruits except bananas, or bran cereals.    Limit caffeine and chocolate.    Limit dairy.    Don t use spices or seasonings except salt.    Go back to your normal diet over time, as you feel better and your symptoms improve.    If the symptoms come back, go back to a simple diet or clear liquids.  Follow-up care  Follow up with your healthcare provider, or as advised. If a stool sample was taken or cultures were done, call the healthcare provider for the results as instructed.  Call 911  Call 911 if you have any of these symptoms:    Trouble breathing    Confusion    Extreme drowsiness or trouble walking    Loss of consciousness    Rapid heart rate    Chest pain    Stiff neck    Seizure  When to seek medical advice  Call your healthcare provider right away if any of these occur:    Abdominal pain that gets worse    Constant lower right abdominal pain    Continued vomiting and inability to keep liquids down    Diarrhea more than 5 times a day    Blood in vomit or stool    Dark urine or no urine for 8 hours,  dry mouth and tongue, tiredness, weakness, or dizziness    Drowsiness    New rash    You don t get better in 2 to 3 days    Fever of 100.4 F (38 C) or higher, or as directed by your healthcare provider  Grisel last reviewed this educational content on 6/1/2018 2000-2021 [employer]. All rights reserved. This information is not intended as a substitute for professional medical care. Always follow your healthcare professional's instructions.

## 2021-09-17 NOTE — PROGRESS NOTES
Impression:  Diarrhea for 2 weeks without alarm symptoms.  Possible viral infection.  Rule out C. difficile.  She is never had a screening colonoscopy so is due for that anyway    Plan:  Imodium A-D as needed, check stool for C. difficile, referral to GI for further evaluation and screening colonoscopy      Chief Complaint:  Patient presents with:  Diarrhea: for a couple, R side of lower back  Abdominal Pain: x1wk, RUQ         HPI:   Ana Barrera is a 56 year old female who presents to this clinic for the evaluation of diarrhea.  Patient has had daily diarrhea for the past 2 weeks.  She feels bloated and has increased gas.  No vomiting.  No melena or hematochezia.  She has taken over-the-counter medications with improvement of the diarrhea.  No recent travel or unpurified water consumption.  No recent antibiotic courses.  No abdominal pain or fever.      PMH:   Past Medical History:   Diagnosis Date     Epilepsy (H)      No past surgical history on file.      ROS:  All other systems negative    Meds:    Current Outpatient Medications:      Divalproex Sodium (DEPAKOTE PO), , Disp: , Rfl:      loperamide (IMODIUM A-D) 2 MG tablet, Take 1 tablet (2 mg) by mouth 4 times daily as needed for diarrhea, Disp: 20 tablet, Rfl: 1     PARoxetine (PAXIL) 40 MG tablet, TAKE 1 TABLET(40 MG) BY MOUTH DAILY, Disp: 30 tablet, Rfl: 0     gabapentin (NEURONTIN) 100 MG capsule, TAKE 3 CAPSULES BY MOUTH AT BEDTIME AS NEEDED FOR SLEEP (Patient not taking: Reported on 9/17/2021), Disp: , Rfl:         Social:  Social History     Socioeconomic History     Marital status:      Spouse name: Not on file     Number of children: Not on file     Years of education: Not on file     Highest education level: Not on file   Occupational History     Not on file   Tobacco Use     Smoking status: Passive Smoke Exposure - Never Smoker     Smokeless tobacco: Never Used     Tobacco comment:  smokes in and out of home   Substance and Sexual  Activity     Alcohol use: Not on file     Drug use: Not on file     Sexual activity: Not on file   Other Topics Concern     Not on file   Social History Narrative     Not on file     Social Determinants of Health     Financial Resource Strain:      Difficulty of Paying Living Expenses:    Food Insecurity:      Worried About Running Out of Food in the Last Year:      Ran Out of Food in the Last Year:    Transportation Needs:      Lack of Transportation (Medical):      Lack of Transportation (Non-Medical):    Physical Activity:      Days of Exercise per Week:      Minutes of Exercise per Session:    Stress:      Feeling of Stress :    Social Connections:      Frequency of Communication with Friends and Family:      Frequency of Social Gatherings with Friends and Family:      Attends [employer] Services:      Active Member of Clubs or Organizations:      Attends Club or Organization Meetings:      Marital Status:    Intimate Partner Violence:      Fear of Current or Ex-Partner:      Emotionally Abused:      Physically Abused:      Sexually Abused:          Physical Exam:  Vitals:    09/17/21 1605   BP: (!) 140/82   Pulse: 92   Resp: 16   Temp: 98.2  F (36.8  C)   TempSrc: Oral   SpO2: 98%   Weight: 94.4 kg (208 lb 1 oz)      Patient is awake, alert, no distress  Eyes: PERRL, EOMI  Head: Atraumatic and normocephalic  Abdomen: Nontender without mass  Neuro: Normal motor and sensory function in all extremities  Psych: Awake, alert, normally responsive      Results:    No results found for this or any previous visit (from the past 24 hour(s)).           Aj Romero MD

## 2021-09-20 ENCOUNTER — LAB (OUTPATIENT)
Dept: LAB | Facility: CLINIC | Age: 56
End: 2021-09-20
Payer: COMMERCIAL

## 2021-09-20 DIAGNOSIS — R19.7 DIARRHEA, UNSPECIFIED TYPE: ICD-10-CM

## 2021-09-20 LAB — C DIFF TOX B STL QL: NEGATIVE

## 2021-09-20 PROCEDURE — 87493 C DIFF AMPLIFIED PROBE: CPT

## 2021-09-22 ENCOUNTER — TELEPHONE (OUTPATIENT)
Dept: FAMILY MEDICINE | Facility: CLINIC | Age: 56
End: 2021-09-22

## 2021-09-22 NOTE — TELEPHONE ENCOUNTER
A voicemail was left for the patient to return call. Please relay the results below to her regarding C.Diff that it was negative.     Thank you

## 2021-09-22 NOTE — TELEPHONE ENCOUNTER
----- Message from Subha Muhammad MD sent at 9/20/2021  6:25 PM CDT -----  Negative for C. difficile, please notify patient.    Subha Muhammad M.D. 9/20/2021 6:25 PM

## 2021-11-13 DIAGNOSIS — Z76.0 ENCOUNTER FOR MEDICATION REFILL: Primary | ICD-10-CM

## 2021-11-15 RX ORDER — DIVALPROEX SODIUM 500 MG/1
TABLET, DELAYED RELEASE ORAL
Qty: 180 TABLET | Refills: 0 | Status: SHIPPED | OUTPATIENT
Start: 2021-11-15 | End: 2022-07-20

## 2022-01-03 DIAGNOSIS — Z76.0 ENCOUNTER FOR MEDICATION REFILL: Primary | ICD-10-CM

## 2022-01-03 DIAGNOSIS — F33.1 MODERATE EPISODE OF RECURRENT MAJOR DEPRESSIVE DISORDER (H): ICD-10-CM

## 2022-01-03 RX ORDER — PAROXETINE 40 MG/1
40 TABLET, FILM COATED ORAL DAILY
Qty: 90 TABLET | Refills: 3 | Status: SHIPPED | OUTPATIENT
Start: 2022-01-03 | End: 2023-03-02

## 2022-02-19 DIAGNOSIS — Z76.0 ENCOUNTER FOR MEDICATION REFILL: Primary | ICD-10-CM

## 2022-02-21 NOTE — TELEPHONE ENCOUNTER
"Routing refill request to provider for review/approval because:  Labs not current:  Multiple  Patient needs to be seen because it has been more than 1 year since last office visit.  Former patient of Susannah Davison & has not established care with another provider.  Please assign refill request to covering provider per Clinic standard process.    Last Written Prescription Date:  11/15/21  Last Fill Quantity: 180,  # refills: 0   Last office visit provider:  10/14/2020     Requested Prescriptions   Pending Prescriptions Disp Refills     divalproex sodium delayed-release (DEPAKOTE) 500 MG DR tablet [Pharmacy Med Name: DIVALPROEX DELAYED RELEASE 500MG TB] 180 tablet 0     Sig: TAKE 1 TABLET BY MOUTH TWICE DAILY       Anti-Seizure Meds Protocol  Failed - 2/19/2022  2:48 PM        Failed - Review Authorizing provider's last note.      Refer to last progress notes: confirm request is for original authorizing provider (cannot be through other providers).          Failed - Normal CBC on file in past 26 months     No lab results found.              Failed - Normal ALT or AST on file in past 26 months     No lab results found.  No lab results found.          Failed - Normal platelet count on file in past 26 months     No lab results found.            Failed - Depakote level within therapeutic range in past 26 months     No results found for: VALPROIC, TALON, GICHVAL  Depakote level must be checked 2-4 weeks after dosage change.          Passed - Recent (12 mo) or future (30 days) visit within the authorizing provider's specialty     Patient has had an office visit with the authorizing provider or a provider within the authorizing providers department within the previous 12 mos or has a future within next 30 days. See \"Patient Info\" tab in inbasket, or \"Choose Columns\" in Meds & Orders section of the refill encounter.              Passed - Medication is active on med list        Passed - No active pregnancy on record        " Passed - No positive pregnancy test in last 12 months             Oneyda Lepe, TONY 02/21/22 1:56 PM

## 2022-02-22 RX ORDER — DIVALPROEX SODIUM 500 MG/1
TABLET, DELAYED RELEASE ORAL
Qty: 180 TABLET | Refills: 0 | OUTPATIENT
Start: 2022-02-22

## 2022-02-22 NOTE — TELEPHONE ENCOUNTER
I saw this patient once and recommended that she establish care with a PCP, which she has not done.  She also did not follow up after I saw her.  I will not prescribe these medications for her because she needs to establish care, have an in-person visit, and follow up as she is directed. These meds are not safe to give when they are not being monitored.

## 2022-03-10 ENCOUNTER — TELEPHONE (OUTPATIENT)
Dept: MAMMOGRAPHY | Facility: CLINIC | Age: 57
End: 2022-03-10

## 2022-03-10 NOTE — TELEPHONE ENCOUNTER
Patient Quality Outreach    Patient is due for the following:   Breast Cancer Screening - Mammogram    NEXT STEPS:   Patient was scheduled for an appointment.    Type of outreach:    Phone, spoke to patient/parent. and scheduled mammogram, 4/19 at 1:00    Next Steps:  Reach out within 120 days via Phone.    Max number of attempts reached: No. Will try again in 120 days if patient still on fail list.    Questions for provider review:    None     MENDY Cote  Chart routed to Care Team.

## 2022-04-19 ENCOUNTER — TELEPHONE (OUTPATIENT)
Dept: FAMILY MEDICINE | Facility: CLINIC | Age: 57
End: 2022-04-19

## 2022-04-19 NOTE — TELEPHONE ENCOUNTER
"Please call Ana and let her know she can be seen today by Dr. Phillips, however, Dr. Phillips is not accepting new patients, thus the visit WILL NOT be \"establish care.\" it will just be a one-time visit.  "

## 2022-04-22 ENCOUNTER — TELEPHONE (OUTPATIENT)
Dept: MAMMOGRAPHY | Facility: CLINIC | Age: 57
End: 2022-04-22

## 2022-07-19 DIAGNOSIS — Z76.0 ENCOUNTER FOR MEDICATION REFILL: ICD-10-CM

## 2022-07-19 DIAGNOSIS — G40.309 GENERALIZED CONVULSIVE EPILEPSY (H): Primary | ICD-10-CM

## 2022-07-19 RX ORDER — DIVALPROEX SODIUM 500 MG/1
500 TABLET, DELAYED RELEASE ORAL 2 TIMES DAILY
Qty: 180 TABLET | Refills: 0 | OUTPATIENT
Start: 2022-07-19

## 2022-07-19 NOTE — TELEPHONE ENCOUNTER
Reason for Call:  Medication or medication refill:    Do you use a [facility]?  Name of the pharmacy and phone number for the current request:  [id number] [address] AT [facility]     Name of the medication requested: divalproex sodium delayed-release (DEPAKOTE) 500 MG DR tablet    Other request: Patient is completely out. She called pharmacy but was told no refills left.    Can we leave a detailed message on this number? YES    Phone number patient can be reached at: Cell number on file:    Telephone Information:   Mobile [phone]       Best Time: any    Call taken on 7/19/2022 at 8:40 AM by Kayleigh Meza

## 2022-07-19 NOTE — TELEPHONE ENCOUNTER
"Routing refill request to provider for review/approval because:  Labs not current:  Multiple  Patient needs to be seen because it has been more than 1 year since last office visit.  A break in medication    Last Written Prescription Date:  11/15/21  Last Fill Quantity: 180,  # refills: 0   Last office visit provider:  10/14/20     Requested Prescriptions   Pending Prescriptions Disp Refills     divalproex sodium delayed-release (DEPAKOTE) 500 MG DR tablet 180 tablet 0     Sig: Take 1 tablet (500 mg) by mouth 2 times daily       Anti-Seizure Meds Protocol  Failed - 7/19/2022  8:49 AM        Failed - Review Authorizing provider's last note.      Refer to last progress notes: confirm request is for original authorizing provider (cannot be through other providers).          Failed - Normal CBC on file in past 26 months     No lab results found.              Failed - Normal ALT or AST on file in past 26 months     No lab results found.  No lab results found.          Failed - Normal platelet count on file in past 26 months     No lab results found.            Failed - Depakote level within therapeutic range in past 26 months     No results found for: VALPROIC, TALON, GICHVAL  Depakote level must be checked 2-4 weeks after dosage change.          Passed - Recent (12 mo) or future (30 days) visit within the authorizing provider's specialty     Patient has had an office visit with the authorizing provider or a provider within the authorizing providers department within the previous 12 mos or has a future within next 30 days. See \"Patient Info\" tab in inbasket, or \"Choose Columns\" in Meds & Orders section of the refill encounter.              Passed - Medication is active on med list        Passed - No active pregnancy on record        Passed - No positive pregnancy test in last 12 months             Alfredo Cottrell RN 07/19/22 8:49 AM  "

## 2022-07-19 NOTE — TELEPHONE ENCOUNTER
I am not her PCP and she has not been seen in the past year.  She needs to establish care with a PCP and be seen.

## 2022-07-19 NOTE — TELEPHONE ENCOUNTER
Reason for Call:  Other prescription    Detailed comments: Pt was advised to establish care, hasn't been seen in over a year. Earliest appt found was 9/20 with Augustin Shoemaker but is out of medication now: Divalproex Sodium (DEPAKOTE PO) divalproex sodium delayed-release (DEPAKOTE) 500 MG DR tablet TAKE 1 TABLET BY MOUTH TWICE DAILY  [facility]    Phone Number Patient can be reached at: Cell number on file:    Telephone Information:   Mobile [phone]       Best Time: ASAP    Can we leave a detailed message on this number? YES    Call taken on 7/19/2022 at 5:42 PM by Laura Kanavel

## 2022-07-20 RX ORDER — DIVALPROEX SODIUM 500 MG/1
500 TABLET, DELAYED RELEASE ORAL 2 TIMES DAILY
Qty: 180 TABLET | Refills: 0 | Status: SHIPPED | OUTPATIENT
Start: 2022-07-20 | End: 2022-09-12

## 2022-07-20 NOTE — TELEPHONE ENCOUNTER
Called patient with no answer.  Left basic message to let patient know medication discussed earlier had been sent to pharmacy.  Call back number provided.    HUBER Alexander, RN  [facility]

## 2022-07-20 NOTE — TELEPHONE ENCOUNTER
Dr. Paul Davison prescribed medication 8 months ago and is now req is requested refill of Depakote. Patient reported has not been consistent with medication as prescribed.  Only takes 1 tab daily previously, but   will adhere to as instructed with future order.   Has upcoming appt with Dr. Shoemaker on 9/29/2022 to establish care.  Medication is taking for seizure d/o and is completely out.  Medication and pharmacy pended if appropriate.      Thank you    Clint Parrish, ADDYN, RN  [facility]

## 2022-08-17 ENCOUNTER — TELEPHONE (OUTPATIENT)
Dept: MAMMOGRAPHY | Facility: CLINIC | Age: 57
End: 2022-08-17

## 2022-08-17 NOTE — TELEPHONE ENCOUNTER
Patient Quality Outreach    Patient is due for the following:   Breast Cancer Screening - Mammogram    Next Steps:   Schedule a office visit for mammogram    Type of outreach:    Phone, left message for patient/parent to call back.2nd call    Next Steps:  Reach out within 90 days via Phone.    Max number of attempts reached: No. Will try again in 90 days if patient still on fail list.    Questions for provider review:    None     MENDY Cote  Chart routed to Care Team.

## 2022-09-09 ENCOUNTER — TELEPHONE (OUTPATIENT)
Dept: MAMMOGRAPHY | Facility: CLINIC | Age: 57
End: 2022-09-09

## 2022-09-09 NOTE — TELEPHONE ENCOUNTER
Patient Quality Outreach    Patient is due for the following:   Breast Cancer Screening - Mammogram    Next Steps:   Schedule a office visit for mammogram    Type of outreach:    Phone, left message for patient/parent to call back.  3rd call    Next Steps:  Reach out within 90 days via Phone.    Max number of attempts reached: Yes. Will try again in 90 days if patient still on fail list.    Questions for provider review:    None     MENDY Cote  Chart routed to Care Team.

## 2022-09-20 ENCOUNTER — OFFICE VISIT (OUTPATIENT)
Dept: FAMILY MEDICINE | Facility: CLINIC | Age: 57
End: 2022-09-20
Payer: COMMERCIAL

## 2022-09-20 VITALS
RESPIRATION RATE: 16 BRPM | DIASTOLIC BLOOD PRESSURE: 78 MMHG | HEIGHT: 65 IN | TEMPERATURE: 99.1 F | SYSTOLIC BLOOD PRESSURE: 116 MMHG | OXYGEN SATURATION: 97 % | HEART RATE: 85 BPM | WEIGHT: 216 LBS | BODY MASS INDEX: 35.99 KG/M2

## 2022-09-20 DIAGNOSIS — G40.309 GENERALIZED CONVULSIVE EPILEPSY (H): ICD-10-CM

## 2022-09-20 DIAGNOSIS — Z13.220 SCREENING FOR HYPERLIPIDEMIA: ICD-10-CM

## 2022-09-20 DIAGNOSIS — Z23 NEED FOR VACCINATION: ICD-10-CM

## 2022-09-20 DIAGNOSIS — Z23 HIGH PRIORITY FOR 2019 NOVEL CORONAVIRUS VACCINATION: ICD-10-CM

## 2022-09-20 DIAGNOSIS — F33.1 MODERATE EPISODE OF RECURRENT MAJOR DEPRESSIVE DISORDER (H): ICD-10-CM

## 2022-09-20 DIAGNOSIS — Z76.0 ENCOUNTER FOR MEDICATION REFILL: ICD-10-CM

## 2022-09-20 DIAGNOSIS — Z23 NEED FOR IMMUNIZATION AGAINST INFLUENZA: Primary | ICD-10-CM

## 2022-09-20 DIAGNOSIS — Z13.9 SCREENING FOR CONDITION: ICD-10-CM

## 2022-09-20 DIAGNOSIS — Z11.59 NEED FOR HEPATITIS C SCREENING TEST: ICD-10-CM

## 2022-09-20 DIAGNOSIS — Z11.4 SCREENING FOR HIV (HUMAN IMMUNODEFICIENCY VIRUS): ICD-10-CM

## 2022-09-20 DIAGNOSIS — R53.83 OTHER FATIGUE: ICD-10-CM

## 2022-09-20 DIAGNOSIS — N95.1 SYMPTOMATIC MENOPAUSAL OR FEMALE CLIMACTERIC STATES: ICD-10-CM

## 2022-09-20 DIAGNOSIS — Z76.89 ENCOUNTER TO ESTABLISH CARE: ICD-10-CM

## 2022-09-20 LAB
ERYTHROCYTE [DISTWIDTH] IN BLOOD BY AUTOMATED COUNT: 13.8 % (ref 10–15)
HCT VFR BLD AUTO: 41.2 % (ref 35–47)
HGB BLD-MCNC: 13.9 G/DL (ref 11.7–15.7)
MCH RBC QN AUTO: 31.7 PG (ref 26.5–33)
MCHC RBC AUTO-ENTMCNC: 33.7 G/DL (ref 31.5–36.5)
MCV RBC AUTO: 94 FL (ref 78–100)
PLATELET # BLD AUTO: 207 10E3/UL (ref 150–450)
RBC # BLD AUTO: 4.38 10E6/UL (ref 3.8–5.2)
WBC # BLD AUTO: 6.5 10E3/UL (ref 4–11)

## 2022-09-20 PROCEDURE — 80053 COMPREHEN METABOLIC PANEL: CPT | Performed by: FAMILY MEDICINE

## 2022-09-20 PROCEDURE — 87389 HIV-1 AG W/HIV-1&-2 AB AG IA: CPT | Performed by: FAMILY MEDICINE

## 2022-09-20 PROCEDURE — 96127 BRIEF EMOTIONAL/BEHAV ASSMT: CPT | Performed by: FAMILY MEDICINE

## 2022-09-20 PROCEDURE — 80061 LIPID PANEL: CPT | Performed by: FAMILY MEDICINE

## 2022-09-20 PROCEDURE — 85027 COMPLETE CBC AUTOMATED: CPT | Performed by: FAMILY MEDICINE

## 2022-09-20 PROCEDURE — 36415 COLL VENOUS BLD VENIPUNCTURE: CPT | Performed by: FAMILY MEDICINE

## 2022-09-20 PROCEDURE — 99215 OFFICE O/P EST HI 40 MIN: CPT | Performed by: FAMILY MEDICINE

## 2022-09-20 PROCEDURE — 86803 HEPATITIS C AB TEST: CPT | Performed by: FAMILY MEDICINE

## 2022-09-20 PROCEDURE — 82977 ASSAY OF GGT: CPT | Performed by: FAMILY MEDICINE

## 2022-09-20 RX ORDER — GABAPENTIN 100 MG/1
CAPSULE ORAL
Qty: 90 CAPSULE | Refills: 4 | Status: SHIPPED | OUTPATIENT
Start: 2022-09-20 | End: 2023-05-22

## 2022-09-20 RX ORDER — DIVALPROEX SODIUM 500 MG/1
500 TABLET, DELAYED RELEASE ORAL 2 TIMES DAILY
Qty: 60 TABLET | Refills: 0 | Status: SHIPPED | OUTPATIENT
Start: 2022-09-20 | End: 2023-07-25

## 2022-09-20 ASSESSMENT — PATIENT HEALTH QUESTIONNAIRE - PHQ9
10. IF YOU CHECKED OFF ANY PROBLEMS, HOW DIFFICULT HAVE THESE PROBLEMS MADE IT FOR YOU TO DO YOUR WORK, TAKE CARE OF THINGS AT HOME, OR GET ALONG WITH OTHER PEOPLE: SOMEWHAT DIFFICULT
SUM OF ALL RESPONSES TO PHQ QUESTIONS 1-9: 13
SUM OF ALL RESPONSES TO PHQ QUESTIONS 1-9: 13

## 2022-09-20 NOTE — LETTER
"September 21, 2022      Ana Barrera  [address] [place] [address]        Dear ,    We are writing to inform you of your test results.    Your cholesterol is 20% higher than normal \"reference\" ranges however this result is like previous tests and so is less worrisome.     To improve your cholesterol health, focus on walking 30 minutes per day, 4 days per week.     All other tests including HIV (screening) and Hep C (screening) are non-reactive and within acceptable limits.     Resulted Orders   Lipid panel reflex to direct LDL Non-fasting   Result Value Ref Range    Cholesterol 238 (H) <200 mg/dL    Triglycerides 126 <150 mg/dL    Direct Measure HDL 70 >=50 mg/dL    LDL Cholesterol Calculated 143 (H) <=100 mg/dL    Non HDL Cholesterol 168 (H) <130 mg/dL    Narrative    Cholesterol  Desirable:  <200 mg/dL    Triglycerides  Normal:  Less than 150 mg/dL  Borderline High:  150-199 mg/dL  High:  200-499 mg/dL  Very High:  Greater than or equal to 500 mg/dL    Direct Measure HDL  Female:  Greater than or equal to 50 mg/dL   Male:  Greater than or equal to 40 mg/dL    LDL Cholesterol  Desirable:  <100mg/dL  Above Desirable:  100-129 mg/dL   Borderline High:  130-159 mg/dL   High:  160-189 mg/dL   Very High:  >= 190 mg/dL    Non HDL Cholesterol  Desirable:  130 mg/dL  Above Desirable:  130-159 mg/dL  Borderline High:  160-189 mg/dL  High:  190-219 mg/dL  Very High:  Greater than or equal to 220 mg/dL   Comprehensive metabolic panel (BMP + Alb, Alk Phos, ALT, AST, Total. Bili, TP)   Result Value Ref Range    Sodium 144 136 - 145 mmol/L    Potassium 5.2 3.4 - 5.3 mmol/L    Chloride 107 98 - 107 mmol/L    Carbon Dioxide (CO2) 26 22 - 29 mmol/L    Anion Gap 11 7 - 15 mmol/L    Urea Nitrogen 13.8 6.0 - 20.0 mg/dL    Creatinine 0.82 0.51 - 0.95 mg/dL    Calcium 9.8 8.6 - 10.0 mg/dL    Glucose 95 70 - 99 mg/dL    Alkaline Phosphatase 79 35 - 104 U/L    AST 34 10 - 35 U/L    ALT 60 (H) 10 - 35 U/L    " Protein Total 6.9 6.4 - 8.3 g/dL    Albumin 4.7 3.5 - 5.2 g/dL    Bilirubin Total 0.4 <=1.2 mg/dL    GFR Estimate 83 >60 mL/min/1.73m2      Comment:      Effective December 21, 2021 eGFRcr in adults is calculated using the 2021 CKD-EPI creatinine equation which includes age and gender (Fred et al., [facility], DOI: 10.1056/LUVHwn6911524)   CBC with platelets   Result Value Ref Range    WBC Count 6.5 4.0 - 11.0 10e3/uL    RBC Count 4.38 3.80 - 5.20 10e6/uL    Hemoglobin 13.9 11.7 - 15.7 g/dL    Hematocrit 41.2 35.0 - 47.0 %    MCV 94 78 - 100 fL    MCH 31.7 26.5 - 33.0 pg    MCHC 33.7 31.5 - 36.5 g/dL    RDW 13.8 10.0 - 15.0 %    Platelet Count 207 150 - 450 10e3/uL   GGT   Result Value Ref Range    GGT 59 (H) 5 - 36 U/L       If you have any questions or concerns, please call the clinic at the number listed above.       Sincerely,      Augustin Shoemaker MD

## 2022-09-20 NOTE — PROGRESS NOTES
Assessment & Plan     Screening for HIV (human immunodeficiency virus)  Patient agrees for this test today.  - HIV Antigen Antibody Combo; Future  - HIV Antigen Antibody Combo    Need for hepatitis C screening test  She agrees for the test today.  - Hepatitis C Screen Reflex to HCV RNA Quant and Genotype; Future  - Comprehensive metabolic panel (BMP + Alb, Alk Phos, ALT, AST, Total. Bili, TP); Future  - Hepatitis C Screen Reflex to HCV RNA Quant and Genotype  - Comprehensive metabolic panel (BMP + Alb, Alk Phos, ALT, AST, Total. Bili, TP)    Screening for hyperlipidemia    She is not fasting she has not been watching her diet were also doing a GGT today  - Lipid panel reflex to direct LDL Non-fasting; Future  - Comprehensive metabolic panel (BMP + Alb, Alk Phos, ALT, AST, Total. Bili, TP); Future  - GGT; Future  - Lipid panel reflex to direct LDL Non-fasting  - Comprehensive metabolic panel (BMP + Alb, Alk Phos, ALT, AST, Total. Bili, TP)  - GGT    Need for immunization against influenza    She did not want the influenza vaccine    High priority for 2019 novel coronavirus vaccination  She did not want the COVID-19 vaccination    Need for vaccination    I discussed the need for vaccination she will approach this at another visit    Encounter for medication refill    I refilled her medications.  I did inform her that excessive alcohol use can affect the way her medications are metabolized  - divalproex sodium delayed-release (DEPAKOTE) 500 MG DR tablet; Take 1 tablet (500 mg) by mouth 2 times daily  - Comprehensive metabolic panel (BMP + Alb, Alk Phos, ALT, AST, Total. Bili, TP); Future  - Comprehensive metabolic panel (BMP + Alb, Alk Phos, ALT, AST, Total. 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  She is not feeling weepy or tired but does feel fatigued.    Other fatigue    Patient complains she does not have enough energy is fatigue says sleep is not a problem however this is been concurrent when when she started taking care of her parents I asked her to talk to her sisters and reach out about splitting caregiving duties.  - CBC with platelets; Future  - CBC with platelets    Screening for condition  She agrees for the colonoscopy.  - Colonoscopy Screening  Referral; Future    956}     BMI:   Estimated body mass index is 35.81 kg/m  as calculated from the following:    Height as of this encounter: 1.654 m (5' 5.12\").    Weight as of this encounter: 98 kg (216 lb).           No follow-ups on file.    Augustin Shoemaker MD  [facility] JANE Perez is a 57 year old, presenting for the following health issues:  Establish Care      History of Present Illness       Reason for visit:  Establish care     Today's PHQ-9         PHQ-9 Total Score: 13    PHQ-9 Q9 Thoughts of better off dead/self-harm past 2 weeks :   Not at all    How difficult have these problems made it for you to do your work, take care of " "things at home, or get along with other people: Somewhat difficult     History of presenting illness        History of presenting illness  57-year-old female here to establish care and discuss health issues.  She reports that for the last 9 months she has been taking care of her parents her father  6 months ago and she has been taking care of her mother her mother is independent but now has been noted to be losing facets of her memory.  She does not remember what she is done the previous day.  She cannot cook meals.  She is active in the home the patient stays with her mother 4 days a week and on the weekend her sisters take over than she lives at her own home.  She reports that she thinks he may have been drinking more alcohol during the week she thinks that she is drinking between 7 to 10 glasses of wine per week but it could be higher.  She is not sure if she drinks to help her sleep she also feels fatigued she has not been able to exercise denies any suicidal ideation she also says that she is having hot flashes at night because she ran out of her gabapentin several months ago.  She states that she has no energy but denies any chest pain, shortness of breath wheezing or abdominal discomfort.  Her  is supportive.  She has not had any seizure-like activity.        Review of Systems   Constitutional, HEENT, cardiovascular, pulmonary, GI, , musculoskeletal, neuro, skin, endocrine and psych systems are negative, except as otherwise noted.      Objective    /78   Pulse 85   Temp 99.1  F (37.3  C) (Oral)   Resp 16   Ht 1.654 m (5' 5.12\")   Wt 98 kg (216 lb)   SpO2 97%   BMI 35.81 kg/m    Body mass index is 35.81 kg/m .  Physical Exam   GENERAL: healthy, alert and no distress  EYES: Eyes grossly normal to inspection, PERRL and conjunctivae and sclerae normal  HENT: ear canals and TM's normal, nose and mouth without ulcers or lesions  NECK: no adenopathy, no asymmetry, masses, or scars and " thyroid normal to palpation  RESP: lungs clear to auscultation - no rales, rhonchi or wheezes  CV: regular rate and rhythm, normal S1 S2, no S3 or S4, no murmur, click or rub, no peripheral edema and peripheral pulses strong  ABDOMEN: soft, nontender, no hepatosplenomegaly, no masses and bowel sounds normal  MS: no gross musculoskeletal defects noted, no edema  SKIN: no suspicious lesions or rashes  NEURO: Normal strength and tone, mentation intact and speech normal  PSYCH: mentation appears normal, affect normal/bright

## 2022-09-21 LAB
ALBUMIN SERPL BCG-MCNC: 4.7 G/DL (ref 3.5–5.2)
ALP SERPL-CCNC: 79 U/L (ref 35–104)
ALT SERPL W P-5'-P-CCNC: 60 U/L (ref 10–35)
ANION GAP SERPL CALCULATED.3IONS-SCNC: 11 MMOL/L (ref 7–15)
AST SERPL W P-5'-P-CCNC: 34 U/L (ref 10–35)
BILIRUB SERPL-MCNC: 0.4 MG/DL
BUN SERPL-MCNC: 13.8 MG/DL (ref 6–20)
CALCIUM SERPL-MCNC: 9.8 MG/DL (ref 8.6–10)
CHLORIDE SERPL-SCNC: 107 MMOL/L (ref 98–107)
CHOLEST SERPL-MCNC: 238 MG/DL
CREAT SERPL-MCNC: 0.82 MG/DL (ref 0.51–0.95)
DEPRECATED HCO3 PLAS-SCNC: 26 MMOL/L (ref 22–29)
GFR SERPL CREATININE-BSD FRML MDRD: 83 ML/MIN/1.73M2
GGT SERPL-CCNC: 59 U/L (ref 5–36)
GLUCOSE SERPL-MCNC: 95 MG/DL (ref 70–99)
HCV AB SERPL QL IA: NONREACTIVE
HDLC SERPL-MCNC: 70 MG/DL
HIV 1+2 AB+HIV1 P24 AG SERPL QL IA: NONREACTIVE
LDLC SERPL CALC-MCNC: 143 MG/DL
NONHDLC SERPL-MCNC: 168 MG/DL
POTASSIUM SERPL-SCNC: 5.2 MMOL/L (ref 3.4–5.3)
PROT SERPL-MCNC: 6.9 G/DL (ref 6.4–8.3)
SODIUM SERPL-SCNC: 144 MMOL/L (ref 136–145)
TRIGL SERPL-MCNC: 126 MG/DL

## 2022-09-21 NOTE — RESULT ENCOUNTER NOTE
Please let the patient now that her liver function tests and kidney function tests are  normal her cholesterol test is 20% higher than normal but corresponds to previous test results.  A walking program would help with her cholesterol she only needs to walk 30 minutes a day 4 times per week

## 2022-11-10 ENCOUNTER — OFFICE VISIT (OUTPATIENT)
Dept: URGENT CARE | Facility: URGENT CARE | Age: 57
End: 2022-11-10
Payer: COMMERCIAL

## 2022-11-10 ENCOUNTER — ANCILLARY PROCEDURE (OUTPATIENT)
Dept: GENERAL RADIOLOGY | Facility: CLINIC | Age: 57
End: 2022-11-10
Payer: COMMERCIAL

## 2022-11-10 VITALS
WEIGHT: 216 LBS | BODY MASS INDEX: 35.81 KG/M2 | TEMPERATURE: 98 F | DIASTOLIC BLOOD PRESSURE: 86 MMHG | RESPIRATION RATE: 20 BRPM | HEART RATE: 89 BPM | OXYGEN SATURATION: 97 % | SYSTOLIC BLOOD PRESSURE: 139 MMHG

## 2022-11-10 DIAGNOSIS — R05.1 ACUTE COUGH: Primary | ICD-10-CM

## 2022-11-10 PROCEDURE — 71046 X-RAY EXAM CHEST 2 VIEWS: CPT | Mod: TC | Performed by: RADIOLOGY

## 2022-11-10 PROCEDURE — 99213 OFFICE O/P EST LOW 20 MIN: CPT

## 2022-11-10 RX ORDER — BENZONATATE 200 MG/1
200 CAPSULE ORAL 3 TIMES DAILY PRN
Qty: 21 CAPSULE | Refills: 1 | Status: SHIPPED | OUTPATIENT
Start: 2022-11-10 | End: 2022-11-10

## 2022-11-10 RX ORDER — BENZONATATE 200 MG/1
200 CAPSULE ORAL 3 TIMES DAILY PRN
Qty: 21 CAPSULE | Refills: 1 | Status: SHIPPED | OUTPATIENT
Start: 2022-11-10 | End: 2023-12-28

## 2022-11-10 NOTE — PROGRESS NOTES
ASSESSMENT:  (R05.1) Acute cough  (primary encounter diagnosis)  Plan: XR Chest 2 Views, benzonatate (TESSALON) 200 MG        capsule    PLAN:  Informed the patient that the chest x-ray does not show any evidence of pneumonia or bronchitis per radiologist interpretation.  Discussed taking Tessalon 3 times a day as needed for cough.  Informed the patient for her nasal congestion she can continue to use Mucinex or try Sudafed.   We talked about using Flonase as directed by the product label for her postnasal drip.  Discussed the need to return to clinic with any new or worsening symptoms.  Patient acknowledged her understanding of the above plan.    Dave Justice, JIMMIE CNP      SUBJECTIVE:  Ana Barrera is a 57 year old female who presents to the clinic today with a chief complaint of cough  for 3 week(s).  Her cough is described as productive green and yellow.   The patient's symptoms are severe and worsening.  Associated symptoms include nasal congestion, myalgias and headache. The patient's symptoms are exacerbated by lying down and activity.  Patient has been using Mucinex, ibuprofen and Delsym to improve symptoms.    Patient did not do an at home COVID test.  She indicates her granddaughter has had similar symptoms and she tested negative for COVID with an at home test.     The patient indicated she has had previous history of postnasal drip    Patient also reports having some acid reflux for 3 weeks which she has been taking Pepto Bismol tablets for.  She indicates this has helped.     ROS  Review of systems negative except as stated above.    OBJECTIVE:  /86   Pulse 89   Temp 98  F (36.7  C) (Tympanic)   Resp 20   Wt 98 kg (216 lb)   SpO2 97%   BMI 35.81 kg/m    GENERAL APPEARANCE: healthy, alert and no distress  EYES: EOMI,  PERRL, conjunctiva clear  HENT: ear canals and TM's normal.  Nose and mouth without ulcers, erythema or lesions  NECK: supple, nontender, no lymphadenopathy  RESP:  lungs clear to auscultation - no rales, rhonchi or wheezes  CV: regular rates and rhythm, normal S1 S2, no murmur noted  NEURO: Normal strength and tone, sensory exam grossly normal,  normal speech and mentation  SKIN: no suspicious lesions or rashes  PSYCH: mentation appears normal    X-RAY: normal chest X-ray per radiologist interpretation

## 2022-11-10 NOTE — PATIENT INSTRUCTIONS
Chest x-ray does not show any evidence of pneumonia or bronchitis per radiologist interpretation.  Use Tessalon 3 times a day as needed for the cough.  For your nasal congestion you can continue to use Mucinex or try Sudafed.  As discussed based on your previous history of postnasal drip, you could also try Flonase as directed by the product label.

## 2022-11-29 ENCOUNTER — TELEPHONE (OUTPATIENT)
Dept: FAMILY MEDICINE | Facility: CLINIC | Age: 57
End: 2022-11-29

## 2022-11-29 DIAGNOSIS — R05.9 COUGH: Primary | ICD-10-CM

## 2022-11-29 DIAGNOSIS — Z76.0 ENCOUNTER FOR MEDICATION REFILL: Primary | ICD-10-CM

## 2022-11-29 DIAGNOSIS — N95.1 SYMPTOMATIC MENOPAUSAL OR FEMALE CLIMACTERIC STATES: ICD-10-CM

## 2022-11-29 DIAGNOSIS — J06.9 VIRAL UPPER RESPIRATORY TRACT INFECTION: ICD-10-CM

## 2022-11-29 NOTE — TELEPHONE ENCOUNTER
Reason for Call:  Medication or medication refill:    Do you use a [facility]?  Name of the pharmacy and phone number for the current request:  Nilam Hoffman [phone]    Name of the medication requested: Robitussin Ac    Other request: no    Can we leave a detailed message on this number? YES    Phone number patient can be reached at: Cell number on file:    Telephone Information:   Mobile [phone]       Best Time: anytime    Call taken on 11/29/2022 at 4:03 PM by Lashon Vines

## 2022-11-29 NOTE — TELEPHONE ENCOUNTER
New Medication Request        What medication are you requesting?: pt seen in [facility] by Dr. GILMAR Gutierrez this am and he ordered this med    Reason for medication request: cugh    Have you taken this medication before?: No    Controlled Substance Agreement on file:   CSA -- Patient Level:    CSA: None found at the patient level.         Patient offered an appointment? No - pt was seen in [facility] this morning     Preferred Pharmacy:   [id number]   [address] [address]  Phone: [phone] Fax: [phone]      Okay to leave a detailed message?: Yes at Cell number on file:    Telephone Information:   Mobile [phone]     Call taken on 11/29/22 at 1114 am by GABI East

## 2022-11-30 RX ORDER — CODEINE PHOSPHATE/GUAIFENESIN 10-100MG/5
5 LIQUID (ML) ORAL EVERY 4 HOURS PRN
Qty: 236 ML | Refills: 1 | OUTPATIENT
Start: 2022-11-30

## 2022-11-30 RX ORDER — CODEINE PHOSPHATE AND GUAIFENESIN 10; 100 MG/5ML; MG/5ML
5 SOLUTION ORAL
COMMUNITY
Start: 2022-11-29 | End: 2023-12-28

## 2022-11-30 RX ORDER — CODEINE PHOSPHATE AND GUAIFENESIN 10; 100 MG/5ML; MG/5ML
1-2 SOLUTION ORAL EVERY 4 HOURS PRN
Qty: 118 ML | Refills: 0 | OUTPATIENT
Start: 2022-11-30

## 2022-11-30 RX ORDER — CODEINE PHOSPHATE AND GUAIFENESIN 10; 100 MG/5ML; MG/5ML
1-2 SOLUTION ORAL EVERY 4 HOURS PRN
Qty: 100 ML | Refills: 0 | Status: SHIPPED | OUTPATIENT
Start: 2022-11-30 | End: 2023-12-28

## 2022-11-30 RX ORDER — GABAPENTIN 100 MG/1
CAPSULE ORAL
Qty: 90 CAPSULE | Refills: 4 | Status: CANCELLED | OUTPATIENT
Start: 2022-11-30

## 2022-11-30 NOTE — TELEPHONE ENCOUNTER
[facility] pharmacy call to check on status of medication requested and message below. Will covering provider willing to send prescription?  Please advise

## 2022-11-30 NOTE — TELEPHONE ENCOUNTER
Chart reviewed.   pdmp reviewed.     This does look appropriate. Will fill for same amount in ER.   She did also test positive for influenza a.   No refills without being seen.     Lynne Noland MD

## 2022-11-30 NOTE — TELEPHONE ENCOUNTER
Pt stated she went to [facility] urgent care yesterday and Dr. esparza prescribed Robitussin AC. However, since [facility] was out of network, insurance would not cover so pt needs prescription from primary care.    - Pended rx with same information from urgent care. Please review and sign if appropriate.    Inocencio Amezquita, BSN RN  [facility]

## 2022-12-01 NOTE — TELEPHONE ENCOUNTER
Called pt with Dr. Shoemaker's response. Pt did get med via  and there are no appts for 2 plus weeks.  Thanks

## 2022-12-03 ENCOUNTER — OFFICE VISIT (OUTPATIENT)
Dept: URGENT CARE | Facility: URGENT CARE | Age: 57
End: 2022-12-03
Payer: COMMERCIAL

## 2022-12-03 ENCOUNTER — NURSE TRIAGE (OUTPATIENT)
Dept: NURSING | Facility: CLINIC | Age: 57
End: 2022-12-03

## 2022-12-03 VITALS
HEART RATE: 85 BPM | SYSTOLIC BLOOD PRESSURE: 131 MMHG | DIASTOLIC BLOOD PRESSURE: 80 MMHG | WEIGHT: 216 LBS | BODY MASS INDEX: 35.81 KG/M2 | OXYGEN SATURATION: 98 % | TEMPERATURE: 98.5 F

## 2022-12-03 DIAGNOSIS — M94.0 COSTOCHONDRITIS: ICD-10-CM

## 2022-12-03 DIAGNOSIS — J40 BRONCHITIS: Primary | ICD-10-CM

## 2022-12-03 PROCEDURE — 99213 OFFICE O/P EST LOW 20 MIN: CPT | Performed by: PHYSICIAN ASSISTANT

## 2022-12-03 RX ORDER — CELECOXIB 100 MG/1
100 CAPSULE ORAL 2 TIMES DAILY
Qty: 10 CAPSULE | Refills: 0 | Status: SHIPPED | OUTPATIENT
Start: 2022-12-03 | End: 2022-12-08

## 2022-12-03 RX ORDER — CYCLOBENZAPRINE HCL 10 MG
10 TABLET ORAL 3 TIMES DAILY PRN
Qty: 30 TABLET | Refills: 0 | Status: SHIPPED | OUTPATIENT
Start: 2022-12-03 | End: 2023-12-28

## 2022-12-03 RX ORDER — CODEINE PHOSPHATE AND GUAIFENESIN 10; 100 MG/5ML; MG/5ML
1-2 SOLUTION ORAL EVERY 4 HOURS PRN
Qty: 236 ML | Refills: 0 | Status: SHIPPED | OUTPATIENT
Start: 2022-12-03 | End: 2023-12-28

## 2022-12-03 NOTE — TELEPHONE ENCOUNTER
Pt is phoning stating that she has influenza A that was dx on 11/29/2022    Pt states that she has been coughing so hard that she feels like she threw her back out     Pt does not have a thermometer      Per disposition: See PCP Within 24 Hours    Care advice given per protocol and when to call back. Pt verbalized understanding and agrees to plan of care.    Caitlin Dobbs RN  [facility] Nurse Advisor  1:38 PM 12/3/2022      COVID 19 Nurse Triage Plan/Patient Instructions    Please be aware that novel coronavirus (COVID-19) may be circulating in the community. If you develop symptoms such as fever, cough, or SOB or if you have concerns about the presence of another infection including coronavirus (COVID-19), please contact your health care provider or visit [email or web address].     Disposition/Instructions    In-Person Visit with provider recommended. Reference Visit Selection Guide.    Thank you for taking steps to prevent the spread of this virus.  o Limit your contact with others.  o Wear a simple mask to cover your cough.  o Wash your hands well and often.    Resources    [facility]: About COVID-19: [email or web address]    CDC: What to Do If You're Sick: www.cdc.gov/coronavirus/2019-ncov/about/steps-when-sick.html    CDC: Ending Home Isolation: www.cdc.gov/coronavirus/2019-ncov/hcp/disposition-in-home-patients.html     CDC: Caring for Someone: www.cdc.gov/coronavirus/2019-ncov/if-you-are-sick/care-for-someone.html     [facility]: Interim Guidance for Hospital Discharge to Home: www.health.[facility].[place]/diseases/coronavirus/hcp/hospdischarge.pdf    [facility] clinical trials (COVID-19 research studies): [email or web address]     Below are the COVID-19 hotlines at the [place] Department of Health ([facility]). Interpreters are available.   o For health questions: Call 229-702-3216 or 1-560.684.9828 (7 a.m. to 7 p.m.)  o For questions about schools and childcare:  Call 915-192-2361 or 1-244.223.2700 (7 a.m. to 7 p.m.)                           Reason for Disposition    [1] Continuous (nonstop) coughing interferes with work or school AND [2] no improvement using cough treatment per Care Advice    Additional Information    Negative: SEVERE difficulty breathing (e.g., struggling for each breath, speaks in single words)    Negative: Bluish (or gray) lips or face now    Negative: [1] Difficulty breathing AND [2] exposure to flames, smoke, or fumes    Negative: [1] Stridor AND [2] difficulty breathing    Negative: Sounds like a life-threatening emergency to the triager    Negative: [1] Previous asthma attacks AND [2] this feels like asthma attack    Negative: Dry cough (non-productive;  no sputum or minimal clear sputum)    Negative: [1] MODERATE difficulty breathing (e.g., speaks in phrases, SOB even at rest, pulse 100-120) AND [2] still present when not coughing    Negative: Chest pain  (Exception: MILD central chest pain, present only when coughing)    Negative: Patient sounds very sick or weak to the triager    Negative: [1] MILD difficulty breathing (e.g., minimal/no SOB at rest, SOB with walking, pulse <100) AND [2] still present when not coughing    Negative: [1] Coughed up blood AND [2] > 1 tablespoon (15 ml)  (Exception: Blood-tinged sputum.)    Negative: Fever > 103 F (39.4 C)    Negative: [1] Fever > 101 F (38.3 C) AND [2] age > 60 years    Negative: [1] Fever > 100.0 F (37.8 C) AND [2] bedridden (e.g., nursing home patient, CVA, chronic illness, recovering from surgery)    Negative: [1] Fever > 100.0 F (37.8 C) AND [2] diabetes mellitus or weak immune system (e.g., HIV positive, cancer chemo, splenectomy, organ transplant, chronic steroids)    Negative: Wheezing is present    Negative: SEVERE coughing spells (e.g., whooping sound after coughing, vomiting after coughing)    Protocols used: COUGH - ACUTE [id number]

## 2022-12-03 NOTE — PROGRESS NOTES
Costochondritis  - celecoxib (CELEBREX) 100 MG capsule; Take 1 capsule (100 mg) by mouth 2 times daily for 5 days  - cyclobenzaprine (FLEXERIL) 10 MG tablet; Take 1 tablet (10 mg) by mouth 3 times daily as needed for muscle spasms    Bronchitis  - guaiFENesin-codeine (ROBITUSSIN AC) 100-10 MG/5ML solution; Take 5-10 mLs by mouth every 4 hours as needed for cough    Age 12 months or more  Okay to use Zarbee's   Okay to use Rx Children Tylenol if prescribed (Dose based on weight)    Age 2-12:   Okay to use Children Motrin or Tylenol over the counter.    Adults:  Okay to take acetaminophen 500 mg- 2 tabs (Total of 1000 mg) every 8 hrs   Okay to take ibuprofen 200 mg- 3 tabs (Total of 600 mg) every 6 hours        Okay to use Neti pot for sinus lavage up to three times daily for congestion and sinus pressure if present. Daily hot shower can be beneficial for congestion and body aches. Okay to use bedroom vaporizer or humidifier if symptoms are worse at night. Nightly Vicks Vapor rub and 5-10 mg of Melatonin okay to use for sleep.     Over the counter cough medication and decongestants okay if not prescribed by me during this visit. For homeopathic alternatives to cough syrup and decongestant, feel free to try Elderberry extract.    Okay to use salt water gargles, warm tea (or warm water with lemon and honey), and lozenges for any throat discomfort. Chloraseptic spray is also highly encourages for throat pain/irritation.     Patient will need to get plenty of rest and drink at least 1.5-2 liters of fluids daily for adults and 1-1.5 liters for children. If vomiting and not tolerating liquids for more than 24 hrs, please go to your nearest emergency department for IV fluids and further treatment.     Patient is not contagious after 1 week from start of symptoms. If possible, wear mask for first 7 days. Wash hands regularly and vigorously for 30 seconds often.     Lance Monge PA-C  [facility]    Subjective   57 year old who presents to clinic today for the following health issues:    Urgent Care and Cough       HPI     Acute Illness  Acute illness concerns: C/O cough for 1 month  Onset/Duration: 1 month- Diagnosed with flu on Tuesday   Symptoms:  Fever: No  Chills/Sweats: No  Headache (location?): No  Sinus Pressure: YES  Conjunctivitis:  No  Ear Pain: no  Rhinorrhea: YES  Congestion: YES  Sore Throat: irritated but no sore   Cough: YES  Wheeze: No  Decreased Appetite: YES  Nausea: No  Vomiting: No  Diarrhea: No  Dysuria/Freq.: No  Dysuria or Hematuria: No  Fatigue/Achiness: YES  Sick/Strep Exposure: No  Therapies tried and outcome: Delsym, benzonatate, cough syrup with codeine, and Mucinex     Pain in chest and back with coughing.    Review of Systems   Review of Systems   See HPI    Objective    Temp: 98.5  F (36.9  C) Temp src: Tympanic BP: 131/80 Pulse: 85     SpO2: 98 %       Physical Exam   Physical Exam  Constitutional:       General: She is not in acute distress.     Appearance: Normal appearance. She is normal weight. She is not ill-appearing, toxic-appearing or diaphoretic.   HENT:      Head: Normocephalic and atraumatic.      Right Ear: Tympanic membrane, ear canal and external ear normal. There is no impacted cerumen.      Left Ear: Tympanic membrane, ear canal and external ear normal. There is no impacted cerumen.      Nose: Nose normal. No congestion or rhinorrhea.      Mouth/Throat:      Mouth: Mucous membranes are moist.      Pharynx: Oropharynx is clear. No oropharyngeal exudate or posterior oropharyngeal erythema.   Cardiovascular:      Rate and Rhythm: Normal rate and regular rhythm.      Pulses: Normal pulses.      Heart sounds: Normal heart sounds. No murmur heard.    No friction rub. No gallop.   Pulmonary:      Effort: Pulmonary effort is normal. No respiratory distress.      Breath sounds: No stridor. Rhonchi present. No rales.   Chest:      Chest wall: No tenderness.    Lymphadenopathy:      Cervical: No cervical adenopathy.   Neurological:      Mental Status: She is alert.   Psychiatric:         Mood and Affect: Mood normal.         Behavior: Behavior normal.         Thought Content: Thought content normal.         Judgment: Judgment normal.          No results found for this or any previous visit (from the past 24 hour(s)).

## 2023-03-01 DIAGNOSIS — Z76.0 ENCOUNTER FOR MEDICATION REFILL: ICD-10-CM

## 2023-03-01 DIAGNOSIS — F33.1 MODERATE EPISODE OF RECURRENT MAJOR DEPRESSIVE DISORDER (H): ICD-10-CM

## 2023-03-02 NOTE — TELEPHONE ENCOUNTER
"Routing refill request to provider for review/approval because:  PHQ-9 outside protocol parameters    Last Written Prescription Date:  1/3/2022  Last Fill Quantity: 90,  # refills: 3   Last office visit provider:  9/20/2022     Requested Prescriptions   Pending Prescriptions Disp Refills     PARoxetine (PAXIL) 40 MG tablet [Pharmacy Med Name: PAROXETINE 40MG TABLETS] 90 tablet 3     Sig: TAKE 1 TABLET(40 MG) BY MOUTH DAILY       SSRIs Protocol Failed - 3/1/2023  2:22 PM        Failed - PHQ-9 score less than 5 in past 6 months     Please review last PHQ-9 score.           Passed - Medication is active on med list        Passed - Patient is age 18 or older        Passed - No active pregnancy on record        Passed - No positive pregnancy test in last 12 months        Passed - Recent (6 mo) or future (30 days) visit within the authorizing provider's specialty     Patient had office visit in the last 6 months or has a visit in the next 30 days with authorizing provider or within the authorizing provider's specialty.  See \"Patient Info\" tab in inbasket, or \"Choose Columns\" in Meds & Orders section of the refill encounter.                 Jemma Head RN 03/02/23 2:13 PM      "

## 2023-03-05 RX ORDER — PAROXETINE 40 MG/1
TABLET, FILM COATED ORAL
Qty: 90 TABLET | Refills: 0 | Status: SHIPPED | OUTPATIENT
Start: 2023-03-05 | End: 2023-06-02

## 2023-05-31 ENCOUNTER — TELEPHONE (OUTPATIENT)
Dept: MAMMOGRAPHY | Facility: CLINIC | Age: 58
End: 2023-05-31
Payer: COMMERCIAL

## 2023-05-31 NOTE — TELEPHONE ENCOUNTER
Patient Quality Outreach    Patient is due for the following:   Breast Cancer Screening - Mammogram    Next Steps:   Schedule a office visit for mammo    Type of outreach:    Phone, left message for patient/parent to call back.   4th call    Next Steps:  Reach out within 90 days via Phone.    Max number of attempts reached: Yes. Will try again in 90 days if patient still on fail list.    Questions for provider review:    None           MENDY Cote  Chart routed to Care Team.

## 2023-06-02 DIAGNOSIS — Z76.0 ENCOUNTER FOR MEDICATION REFILL: Primary | ICD-10-CM

## 2023-06-02 DIAGNOSIS — F33.1 MODERATE EPISODE OF RECURRENT MAJOR DEPRESSIVE DISORDER (H): ICD-10-CM

## 2023-06-02 RX ORDER — PAROXETINE 40 MG/1
40 TABLET, FILM COATED ORAL DAILY
Qty: 90 TABLET | Refills: 0 | Status: SHIPPED | OUTPATIENT
Start: 2023-06-02 | End: 2023-09-21

## 2023-06-02 NOTE — TELEPHONE ENCOUNTER
Orders placed as requested.  Please call patient to assist in scheduling an appointment within the next 3 months for follow-up with Dr. Shoemaker, who patient last saw in 09/2022.    Sheldon Finn MD  [facility]  6/2/2023  4:10 PM    Ana was seen today for medication refill.    Diagnoses and all orders for this visit:    Encounter for medication refill  -     PARoxetine (PAXIL) 40 MG tablet; Take 1 tablet (40 mg) by mouth daily SEE YOUR DOCTOR FOR FURTHER REFILLS.    Moderate episode of recurrent major depressive disorder (H)  -     PARoxetine (PAXIL) 40 MG tablet; Take 1 tablet (40 mg) by mouth daily SEE YOUR DOCTOR FOR FURTHER REFILLS.

## 2023-06-02 NOTE — TELEPHONE ENCOUNTER
"Routing refill request to provider for review/approval because:  Patient needs to be seen because:  Needs new PCP  Has not been sen since 9/20/2022  PHQ-9 score from 9/20/2022 was 13    Last Written Prescription Date:  3/5/2023  Last Fill Quantity: 90,  # refills: 0   Last office visit provider:  9/20/2022     Requested Prescriptions   Pending Prescriptions Disp Refills     PARoxetine (PAXIL) 40 MG tablet [Pharmacy Med Name: PAROXETINE 40MG TABLETS] 90 tablet 0     Sig: TAKE 1 TABLET(40 MG) BY MOUTH DAILY       SSRIs Protocol Failed - 6/2/2023  1:54 PM        Failed - PHQ-9 score less than 5 in past 6 months     Please review last PHQ-9 score.           Failed - Recent (6 mo) or future (30 days) visit within the authorizing provider's specialty     Patient had office visit in the last 6 months or has a visit in the next 30 days with authorizing provider or within the authorizing provider's specialty.  See \"Patient Info\" tab in inbasket, or \"Choose Columns\" in Meds & Orders section of the refill encounter.            Passed - Medication is active on med list        Passed - Patient is age 18 or older        Passed - No active pregnancy on record        Passed - No positive pregnancy test in last 12 months             Jaquelin Ross RN 06/02/23 1:54 PM  "

## 2023-07-25 DIAGNOSIS — G40.309 GENERALIZED CONVULSIVE EPILEPSY (H): ICD-10-CM

## 2023-07-25 DIAGNOSIS — Z76.0 ENCOUNTER FOR MEDICATION REFILL: ICD-10-CM

## 2023-07-25 RX ORDER — DIVALPROEX SODIUM 500 MG/1
TABLET, DELAYED RELEASE ORAL
Qty: 60 TABLET | Refills: 0 | Status: SHIPPED | OUTPATIENT
Start: 2023-07-25 | End: 2023-07-27

## 2023-07-25 NOTE — TELEPHONE ENCOUNTER
"Routing refill request to provider for review/approval because:  Labs not current  A break in medication    Last Written Prescription Date:  9/20/22  Last Fill Quantity: 60,  # refills: 0   Last office visit provider:  9/20/22     Requested Prescriptions   Pending Prescriptions Disp Refills    divalproex sodium delayed-release (DEPAKOTE) 500 MG DR tablet [Pharmacy Med Name: DIVALPROEX DELAYED RELEASE 500MG TB] 60 tablet 0     Sig: TAKE 1 TABLET(500 MG) BY MOUTH TWICE DAILY       Anti-Seizure Meds Protocol  Failed - 7/25/2023 10:50 AM        Failed - Review Authorizing provider's last note.      Refer to last progress notes: confirm request is for original authorizing provider (cannot be through other providers).          Failed - Depakote level within therapeutic range in past 26 months     No results found for: VALPROIC, VALPROATE, GICHVAL  Depakote level must be checked 2-4 weeks after dosage change.          Passed - Recent (12 mo) or future (30 days) visit within the authorizing provider's specialty     Patient has had an office visit with the authorizing provider or a provider within the authorizing providers department within the previous 12 mos or has a future within next 30 days. See \"Patient Info\" tab in inbasket, or \"Choose Columns\" in Meds & Orders section of the refill encounter.              Passed - Normal CBC on file in past 26 months     Recent Labs   Lab Test 09/20/22  1344   WBC 6.5   RBC 4.38   HGB 13.9   HCT 41.2                    Passed - Normal ALT or AST on file in past 26 months     Recent Labs   Lab Test 09/20/22  1344   ALT 60*     Recent Labs   Lab Test 09/20/22  1344   AST 34             Passed - Normal platelet count on file in past 26 months     Recent Labs   Lab Test 09/20/22  1344                  Passed - Medication is active on med list        Passed - No active pregnancy on record        Passed - No positive pregnancy test in last 12 months             JUDD CHAUHAN" TONY VALENCIA 07/25/23 2:34 PM

## 2023-07-26 NOTE — TELEPHONE ENCOUNTER
"Routing refill request to provider for review/approval because:  Early refill request. Authorized on 7/25/2023, requesting 90 day supply    Last Written Prescription Date:  7/25/2023  Last Fill Quantity: 60,  # refills: 0   Last office visit provider:  12/3/2022     Requested Prescriptions   Pending Prescriptions Disp Refills    divalproex sodium delayed-release (DEPAKOTE) 500 MG DR tablet [Pharmacy Med Name: DIVALPROEX DELAYED RELEASE 500MG TB] 180 tablet      Sig: TAKE 1 TABLET(500 MG) BY MOUTH TWICE DAILY       Anti-Seizure Meds Protocol  Failed - 7/25/2023  4:27 PM        Failed - Review Authorizing provider's last note.      Refer to last progress notes: confirm request is for original authorizing provider (cannot be through other providers).          Failed - Depakote level within therapeutic range in past 26 months     No results found for: VALPROIC, VALPROATE, GICHVAL  Depakote level must be checked 2-4 weeks after dosage change.          Passed - Recent (12 mo) or future (30 days) visit within the authorizing provider's specialty     Patient has had an office visit with the authorizing provider or a provider within the authorizing providers department within the previous 12 mos or has a future within next 30 days. See \"Patient Info\" tab in inbasket, or \"Choose Columns\" in Meds & Orders section of the refill encounter.              Passed - Normal CBC on file in past 26 months     Recent Labs   Lab Test 09/20/22  1344   WBC 6.5   RBC 4.38   HGB 13.9   HCT 41.2                    Passed - Normal ALT or AST on file in past 26 months     Recent Labs   Lab Test 09/20/22  1344   ALT 60*     Recent Labs   Lab Test 09/20/22  1344   AST 34             Passed - Normal platelet count on file in past 26 months     Recent Labs   Lab Test 09/20/22  1344                  Passed - Medication is active on med list        Passed - No active pregnancy on record        Passed - No positive pregnancy test in last " 12 months             Jemma Head, TONY 07/26/23 11:12 AM

## 2023-07-27 RX ORDER — DIVALPROEX SODIUM 500 MG/1
TABLET, DELAYED RELEASE ORAL
Qty: 180 TABLET | Refills: 0 | Status: SHIPPED | OUTPATIENT
Start: 2023-07-27 | End: 2023-08-30

## 2023-08-30 DIAGNOSIS — G40.309 GENERALIZED CONVULSIVE EPILEPSY (H): ICD-10-CM

## 2023-08-30 DIAGNOSIS — Z76.0 ENCOUNTER FOR MEDICATION REFILL: ICD-10-CM

## 2023-08-30 RX ORDER — DIVALPROEX SODIUM 500 MG/1
TABLET, DELAYED RELEASE ORAL
Qty: 180 TABLET | Refills: 0 | Status: SHIPPED | OUTPATIENT
Start: 2023-08-30 | End: 2023-12-18

## 2023-08-30 NOTE — TELEPHONE ENCOUNTER
"Routing refill request to provider for review/approval because:  Labs not current:  Depakote level  Review Authorizing provider's last note  Early refill request    Last Written Prescription Date:  7/27/23  Last Fill Quantity: 180,  # refills: 0   Last office visit provider:   9/20/22    Requested Prescriptions   Pending Prescriptions Disp Refills    divalproex sodium delayed-release (DEPAKOTE) 500 MG DR tablet [Pharmacy Med Name: DIVALPROEX DELAYED RELEASE 500MG TB] 180 tablet 0     Sig: TAKE 1 TABLET(500 MG) BY MOUTH TWICE DAILY       Anti-Seizure Meds Protocol  Failed - 8/30/2023  3:51 AM        Failed - Review Authorizing provider's last note.      Refer to last progress notes: confirm request is for original authorizing provider (cannot be through other providers).          Failed - Depakote level within therapeutic range in past 26 months     No results found for: VALPROIC, VALPROATE, GICHVAL  Depakote level must be checked 2-4 weeks after dosage change.          Passed - Recent (12 mo) or future (30 days) visit within the authorizing provider's specialty     Patient has had an office visit with the authorizing provider or a provider within the authorizing providers department within the previous 12 mos or has a future within next 30 days. See \"Patient Info\" tab in inbasket, or \"Choose Columns\" in Meds & Orders section of the refill encounter.              Passed - Normal CBC on file in past 26 months     Recent Labs   Lab Test 09/20/22  1344   WBC 6.5   RBC 4.38   HGB 13.9   HCT 41.2                    Passed - Normal ALT or AST on file in past 26 months     Recent Labs   Lab Test 09/20/22  1344   ALT 60*     Recent Labs   Lab Test 09/20/22  1344   AST 34             Passed - Normal platelet count on file in past 26 months     Recent Labs   Lab Test 09/20/22  1344                  Passed - Medication is active on med list        Passed - No active pregnancy on record        Passed - No positive " pregnancy test in last 12 months             Lori Perez, TONY 08/30/23 1:44 PM

## 2023-09-21 DIAGNOSIS — F33.1 MODERATE EPISODE OF RECURRENT MAJOR DEPRESSIVE DISORDER (H): ICD-10-CM

## 2023-09-21 DIAGNOSIS — Z76.0 ENCOUNTER FOR MEDICATION REFILL: ICD-10-CM

## 2023-09-21 RX ORDER — PAROXETINE 40 MG/1
TABLET, FILM COATED ORAL
Qty: 90 TABLET | Refills: 0 | Status: SHIPPED | OUTPATIENT
Start: 2023-09-21 | End: 2023-12-26

## 2023-11-22 ENCOUNTER — OFFICE VISIT (OUTPATIENT)
Dept: URGENT CARE | Facility: URGENT CARE | Age: 58
End: 2023-11-22
Payer: COMMERCIAL

## 2023-11-22 VITALS
WEIGHT: 219.2 LBS | HEART RATE: 82 BPM | RESPIRATION RATE: 20 BRPM | BODY MASS INDEX: 36.34 KG/M2 | DIASTOLIC BLOOD PRESSURE: 80 MMHG | SYSTOLIC BLOOD PRESSURE: 145 MMHG | TEMPERATURE: 97.5 F | OXYGEN SATURATION: 98 %

## 2023-11-22 DIAGNOSIS — R35.0 FREQUENT URINATION: ICD-10-CM

## 2023-11-22 DIAGNOSIS — N30.00 ACUTE CYSTITIS WITHOUT HEMATURIA: Primary | ICD-10-CM

## 2023-11-22 LAB
ALBUMIN UR-MCNC: 30 MG/DL
APPEARANCE UR: ABNORMAL
BILIRUB UR QL STRIP: NEGATIVE
COLOR UR AUTO: YELLOW
GLUCOSE UR STRIP-MCNC: NEGATIVE MG/DL
HGB UR QL STRIP: ABNORMAL
KETONES UR STRIP-MCNC: ABNORMAL MG/DL
LEUKOCYTE ESTERASE UR QL STRIP: ABNORMAL
NITRATE UR QL: NEGATIVE
PH UR STRIP: 6.5 [PH] (ref 5–7)
RBC #/AREA URNS AUTO: ABNORMAL /HPF
SP GR UR STRIP: 1.02 (ref 1–1.03)
UROBILINOGEN UR STRIP-ACNC: 1 E.U./DL
WBC #/AREA URNS AUTO: >100 /HPF
WBC CLUMPS #/AREA URNS HPF: PRESENT /HPF

## 2023-11-22 PROCEDURE — 81001 URINALYSIS AUTO W/SCOPE: CPT

## 2023-11-22 PROCEDURE — 99213 OFFICE O/P EST LOW 20 MIN: CPT | Performed by: NURSE PRACTITIONER

## 2023-11-22 PROCEDURE — 87086 URINE CULTURE/COLONY COUNT: CPT | Performed by: NURSE PRACTITIONER

## 2023-11-22 RX ORDER — SULFAMETHOXAZOLE/TRIMETHOPRIM 800-160 MG
1 TABLET ORAL 2 TIMES DAILY
Qty: 10 TABLET | Refills: 0 | Status: SHIPPED | OUTPATIENT
Start: 2023-11-22 | End: 2023-11-27

## 2023-11-23 LAB — BACTERIA UR CULT: NORMAL

## 2023-11-23 NOTE — PROGRESS NOTES
Chief Complaint   Patient presents with    Urinary Problem     Frequent and pain x today     SUBJECTIVE:   Ana Barrera is a 58 year old female who  presents today for a possible UTI.   She has symptoms of dysuria, urgency, frequency, burning, and suprapubic pain and pressure that have been going on for 1 day(s).    Symptom timing described as sudden onset and moderate in severity.    This patient does have a history of urinary tract infections.  There is no history of hematuria, flank pain, fever, chills, nausea or vomiting.    Past Medical History:   Diagnosis Date    Epilepsy (H)      Current Outpatient Medications   Medication Sig Dispense Refill    divalproex sodium delayed-release (DEPAKOTE) 500 MG DR tablet TAKE 1 TABLET(500 MG) BY MOUTH TWICE DAILY 180 tablet 0    gabapentin (NEURONTIN) 100 MG capsule TAKE 3 CAPSULES BY MOUTH AT BEDTIME AS NEEDED FOR SLEEP 90 capsule 3    PARoxetine (PAXIL) 40 MG tablet TAKE 1 TABLET BY MOUTH DAILY. SEE DOCTOR FOR FURTHER REFILLS 90 tablet 0    sulfamethoxazole-trimethoprim (BACTRIM DS) 800-160 MG tablet Take 1 tablet by mouth 2 times daily for 5 days 10 tablet 0    benzonatate (TESSALON) 200 MG capsule Take 1 capsule (200 mg) by mouth 3 times daily as needed for cough 21 capsule 1    celecoxib (CELEBREX) 100 MG capsule Take 1 capsule (100 mg) by mouth 2 times daily for 5 days 10 capsule 0    cyclobenzaprine (FLEXERIL) 10 MG tablet Take 1 tablet (10 mg) by mouth 3 times daily as needed for muscle spasms 30 tablet 0    Divalproex Sodium (DEPAKOTE PO)       guaiFENesin-codeine (ROBITUSSIN AC) 100-10 MG/5ML solution Take 5-10 mLs by mouth every 4 hours as needed for cough 236 mL 0    guaiFENesin-codeine (ROBITUSSIN AC) 100-10 MG/5ML solution Take 5 mLs by mouth      guaiFENesin-codeine (ROBITUSSIN AC) 100-10 MG/5ML solution Take 5-10 mLs by mouth every 4 hours as needed for cough NO REFILLS 100 mL 0    loperamide (IMODIUM A-D) 2 MG tablet Take 1 tablet (2 mg) by mouth 4  times daily as needed for diarrhea 20 tablet 1     Social History     Tobacco Use    Smoking status: Never     Passive exposure: Yes    Smokeless tobacco: Never    Tobacco comments:      smokes in and out of home   Substance Use Topics    Alcohol use: Not on file     Allergies   Allergen Reactions    Penicillins        Review of Systems  All systems negative except for those listed above in HPI.    OBJECTIVE:  BP (!) 145/80 (BP Location: Right arm, Patient Position: Sitting, Cuff Size: Adult Large)   Pulse 82   Temp 97.5  F (36.4  C) (Tympanic)   Resp 20   Wt 99.4 kg (219 lb 3.2 oz)   SpO2 98%   BMI 36.34 kg/m      Physical Exam  Constitutional:       General: She is not in acute distress.     Appearance: She is well-developed. She is not diaphoretic.   Cardiovascular:      Pulses: Normal pulses.   Pulmonary:      Effort: Pulmonary effort is normal.   Abdominal:      General: Bowel sounds are normal.      Palpations: Abdomen is soft.      Tenderness: There is no abdominal tenderness. There is no right CVA tenderness or left CVA tenderness.   Musculoskeletal:         General: Normal range of motion.   Skin:     General: Skin is warm and dry.      Capillary Refill: Capillary refill takes less than 2 seconds.      Coloration: Skin is not pale.   Neurological:      General: No focal deficit present.      Mental Status: She is alert and oriented to person, place, and time.   Psychiatric:         Mood and Affect: Mood normal.         Behavior: Behavior normal.       Results for orders placed or performed in visit on 11/22/23   UA Macroscopic with reflex to Microscopic and Culture - Clinic Collect     Status: Abnormal    Specimen: Urine, Clean Catch   Result Value Ref Range    Color Urine Yellow Colorless, Straw, Light Yellow, Yellow    Appearance Urine Cloudy (A) Clear    Glucose Urine Negative Negative mg/dL    Bilirubin Urine Negative Negative    Ketones Urine Trace (A) Negative mg/dL    Specific Gravity  Urine 1.025 1.003 - 1.035    Blood Urine Moderate (A) Negative    pH Urine 6.5 5.0 - 7.0    Protein Albumin Urine 30 (A) Negative mg/dL    Urobilinogen Urine 1.0 0.2, 1.0 E.U./dL    Nitrite Urine Negative Negative    Leukocyte Esterase Urine Moderate (A) Negative   Urine Microscopic Exam     Status: Abnormal   Result Value Ref Range    RBC Urine 5-10 (A) 0-2 /HPF /HPF    WBC Urine >100 (A) 0-5 /HPF /HPF    WBC Clumps Urine Present (A) None Seen /HPF     ASSESSMENT:    ICD-10-CM    1. Acute cystitis without hematuria  N30.00 sulfamethoxazole-trimethoprim (BACTRIM DS) 800-160 MG tablet      2. Frequent urination  R35.0 UA Macroscopic with reflex to Microscopic and Culture - Clinic Collect     Urine Microscopic Exam     Urine Culture        PLAN:     Take full course of antibiotics.  Urine culture pending, we will call you only if culture shows resistance and change of antibiotic is required or if no growth to stop antibiotics and to follow-up with your primary care provider.  May use over the counter AZO pain relief or Uristat (phenazopyridine) for urinary burning but do not use for 24 hours before future urine tests.  Drink plenty of fluids. Limit caffeine and alcohol as these are bladder irritants.  May take tylenol or ibuprofen as needed for discomfort.   If you develop any vomiting, high fevers or lower back pain, these can be signs of a kidney infection and you should be seen in urgent care or in the ER.  Prevention of future infections by drinking cranberry juice, urination after intercourse and wiping from front to back after using the toilet.  Please follow up with primary care provider if symptoms return, if you're not improving, worsening or new symptoms or for any adverse reactions to medications.     Follow up with primary care provider with any problems, questions or concerns or if symptoms worsen or fail to improve. Patient agreed to plan and verbalized understanding.    Elyse Drew [facility]  [facility] [place]

## 2023-11-23 NOTE — PATIENT INSTRUCTIONS
Take full course of antibiotics.  Urine culture pending, we will call you only if culture shows resistance and change of antibiotic is required or if no growth to stop antibiotics and to follow-up with your primary care provider.  May use over the counter AZO pain relief or Uristat (phenazopyridine) for urinary burning but do not use for 24 hours before future urine tests.  Drink plenty of fluids. Limit caffeine and alcohol as these are bladder irritants.  May take tylenol or ibuprofen as needed for discomfort.   If you develop any vomiting, high fevers or lower back pain, these can be signs of a kidney infection and you should be seen in urgent care or in the ER.  Prevention of future infections by drinking cranberry juice, urination after intercourse and wiping from front to back after using the toilet.  Please follow up with primary care provider if symptoms return, if you're not improving, worsening or new symptoms or for any adverse reactions to medications.

## 2023-12-18 DIAGNOSIS — Z76.0 ENCOUNTER FOR MEDICATION REFILL: ICD-10-CM

## 2023-12-18 DIAGNOSIS — G40.309 GENERALIZED CONVULSIVE EPILEPSY (H): ICD-10-CM

## 2023-12-18 RX ORDER — DIVALPROEX SODIUM 500 MG/1
TABLET, DELAYED RELEASE ORAL
Qty: 180 TABLET | Refills: 0 | Status: SHIPPED | OUTPATIENT
Start: 2023-12-18 | End: 2024-07-19

## 2023-12-22 DIAGNOSIS — F33.1 MODERATE EPISODE OF RECURRENT MAJOR DEPRESSIVE DISORDER (H): ICD-10-CM

## 2023-12-22 DIAGNOSIS — Z76.0 ENCOUNTER FOR MEDICATION REFILL: ICD-10-CM

## 2023-12-26 RX ORDER — PAROXETINE 40 MG/1
40 TABLET, FILM COATED ORAL DAILY
Qty: 90 TABLET | Refills: 0 | Status: SHIPPED | OUTPATIENT
Start: 2023-12-26 | End: 2024-03-25

## 2023-12-28 ENCOUNTER — OFFICE VISIT (OUTPATIENT)
Dept: FAMILY MEDICINE | Facility: CLINIC | Age: 58
End: 2023-12-28
Payer: COMMERCIAL

## 2023-12-28 ENCOUNTER — HOSPITAL ENCOUNTER (OUTPATIENT)
Dept: GENERAL RADIOLOGY | Facility: HOSPITAL | Age: 58
Discharge: HOME | End: 2023-12-28
Attending: NURSE PRACTITIONER | Admitting: NURSE PRACTITIONER
Payer: COMMERCIAL

## 2023-12-28 ENCOUNTER — TELEPHONE (OUTPATIENT)
Dept: FAMILY MEDICINE | Facility: CLINIC | Age: 58
End: 2023-12-28

## 2023-12-28 VITALS
HEART RATE: 90 BPM | TEMPERATURE: 99.3 F | OXYGEN SATURATION: 98 % | DIASTOLIC BLOOD PRESSURE: 81 MMHG | RESPIRATION RATE: 14 BRPM | WEIGHT: 216 LBS | SYSTOLIC BLOOD PRESSURE: 131 MMHG | BODY MASS INDEX: 35.81 KG/M2

## 2023-12-28 DIAGNOSIS — J40 BRONCHITIS: ICD-10-CM

## 2023-12-28 DIAGNOSIS — R05.1 ACUTE COUGH: ICD-10-CM

## 2023-12-28 DIAGNOSIS — J18.9 COMMUNITY ACQUIRED PNEUMONIA OF LEFT LOWER LOBE OF LUNG: Primary | ICD-10-CM

## 2023-12-28 LAB
FLUAV AG SPEC QL IA: NEGATIVE
FLUBV AG SPEC QL IA: NEGATIVE

## 2023-12-28 PROCEDURE — 87635 SARS-COV-2 COVID-19 AMP PRB: CPT | Performed by: NURSE PRACTITIONER

## 2023-12-28 PROCEDURE — 71046 X-RAY EXAM CHEST 2 VIEWS: CPT

## 2023-12-28 PROCEDURE — 99214 OFFICE O/P EST MOD 30 MIN: CPT | Performed by: NURSE PRACTITIONER

## 2023-12-28 PROCEDURE — 87804 INFLUENZA ASSAY W/OPTIC: CPT | Performed by: NURSE PRACTITIONER

## 2023-12-28 RX ORDER — CEFIXIME 400 MG/1
400 CAPSULE ORAL DAILY
Qty: 7 CAPSULE | Refills: 0 | Status: SHIPPED | OUTPATIENT
Start: 2023-12-28 | End: 2023-12-28 | Stop reason: ALTCHOICE

## 2023-12-28 RX ORDER — CODEINE PHOSPHATE AND GUAIFENESIN 10; 100 MG/5ML; MG/5ML
1-2 SOLUTION ORAL EVERY 4 HOURS PRN
Qty: 236 ML | Refills: 0 | Status: SHIPPED | OUTPATIENT
Start: 2023-12-28

## 2023-12-28 RX ORDER — ALBUTEROL SULFATE 90 UG/1
1-2 AEROSOL, METERED RESPIRATORY (INHALATION) EVERY 4 HOURS PRN
Qty: 18 G | Refills: 0 | Status: SHIPPED | OUTPATIENT
Start: 2023-12-28

## 2023-12-28 RX ORDER — AZITHROMYCIN 250 MG/1
TABLET, FILM COATED ORAL
Qty: 6 TABLET | Refills: 0 | Status: SHIPPED | OUTPATIENT
Start: 2023-12-28 | End: 2024-01-02

## 2023-12-28 RX ORDER — CEFDINIR 300 MG/1
300 CAPSULE ORAL 2 TIMES DAILY
Qty: 14 CAPSULE | Refills: 0 | Status: SHIPPED | OUTPATIENT
Start: 2023-12-28 | End: 2024-01-04

## 2023-12-28 NOTE — PROGRESS NOTES
SUBJECTIVE:   Ana Barrera is a 58 year old female presenting with a chief complaint of   Chief Complaint   Patient presents with    Cough     X 1 week, back hurts, hears some crackling. Can't sleep due to coughing.        She is an established patient of [facility].    She is here today for a cough for about one week, the cough is getting worse and she is unable to sleep at night due to the covid and she is having back pain. She did pull a muscle last year when she had the same thing. She did not take a covid test. She has a low grade fever today, but she has not checked herself at home, she does have hot flashes which makes it hard to tell. She does have a sore throat. She feels that she has a rattle in her throat when she breathes. She is a non smoker. She is exposed to smoke. She has also had a headache as well.     Review of Systems    Past Medical History:   Diagnosis Date    Epilepsy (H)      Family History   Problem Relation Age of Onset    Breast Cancer Maternal Aunt 65.00     Current Outpatient Medications   Medication Sig Dispense Refill    albuterol (PROAIR HFA/PROVENTIL HFA/VENTOLIN HFA) 108 (90 Base) MCG/ACT inhaler Inhale 1-2 puffs into the lungs every 4 hours as needed for shortness of breath, wheezing or cough 18 g 0    azithromycin (ZITHROMAX) 250 MG tablet Take 2 tablets (500 mg) by mouth daily for 1 day, THEN 1 tablet (250 mg) daily for 4 days. 6 tablet 0    cefdinir (OMNICEF) 300 MG capsule Take 1 capsule (300 mg) by mouth 2 times daily for 7 days 14 capsule 0    Divalproex Sodium (DEPAKOTE PO)       divalproex sodium delayed-release (DEPAKOTE) 500 MG DR tablet TAKE 1 TABLET(500 MG) BY MOUTH TWICE DAILY 180 tablet 0    gabapentin (NEURONTIN) 100 MG capsule TAKE 3 CAPSULES BY MOUTH AT BEDTIME AS NEEDED FOR SLEEP.  Need clinic appointment for further refills. 90 capsule 0    guaiFENesin-codeine (ROBITUSSIN AC) 100-10 MG/5ML solution Take 5-10 mLs by mouth every 4 hours as needed for cough 236  mL 0    PARoxetine (PAXIL) 40 MG tablet Take 1 tablet (40 mg) by mouth daily You need to be seen at the clinic to establish care with a new physician.  Thanks. 90 tablet 0    celecoxib (CELEBREX) 100 MG capsule Take 1 capsule (100 mg) by mouth 2 times daily for 5 days 10 capsule 0     Social History     Tobacco Use    Smoking status: Never     Passive exposure: Yes    Smokeless tobacco: Never    Tobacco comments:      smokes in and out of home   Substance Use Topics    Alcohol use: Not on file       OBJECTIVE  /81 (BP Location: Right arm, Patient Position: Sitting, Cuff Size: Adult Regular)   Pulse 90   Temp 99.3  F (37.4  C) (Oral)   Resp 14   Wt 98 kg (216 lb)   SpO2 98%   BMI 35.81 kg/m      Physical Exam  Constitutional:       Appearance: She is ill-appearing.   HENT:      Head: Normocephalic.      Right Ear: Tympanic membrane normal.      Left Ear: Tympanic membrane normal.      Nose: Rhinorrhea present. No congestion.   Pulmonary:      Breath sounds: Wheezing and rhonchi present.   Chest:      Chest wall: Tenderness present.   Neurological:      Mental Status: She is oriented to person, place, and time.   Psychiatric:         Mood and Affect: Mood normal.         Behavior: Behavior normal.         Labs:  Results for orders placed or performed in visit on 12/28/23 (from the past 24 hour(s))   Influenza A & B Antigen - Clinic Collect    Specimen: Nose; Swab   Result Value Ref Range    Influenza A antigen Negative Negative    Influenza B antigen Negative Negative    Narrative    Test results must be correlated with clinical data. If necessary, results should be confirmed by a molecular assay or viral culture.       X-Ray was done, my findings are: left lower lobe consolidation noted,     ASSESSMENT:      ICD-10-CM    1. Community acquired pneumonia of left lower lobe of lung  J18.9 azithromycin (ZITHROMAX) 250 MG tablet     albuterol (PROAIR HFA/PROVENTIL HFA/VENTOLIN HFA) 108 (90 Base) MCG/ACT  inhaler     cefdinir (OMNICEF) 300 MG capsule     DISCONTINUED: cefixime (SUPRAX) 400 MG capsule      2. Acute cough  R05.1 Influenza A & B Antigen - Clinic Collect     Symptomatic COVID-19 Virus (Coronavirus) by PCR Nose     XR Chest 2 Views      3. Bronchitis  J40 guaiFENesin-codeine (ROBITUSSIN AC) 100-10 MG/5ML solution        PLAN:  - educated on plan to follow up if not improving  - will cover with cefdinir and zpack  - discussed pulmonary rehab    Followup:    If not improving or if condition worsens, follow up with your Primary Care Provider    There are no Patient Instructions on file for this visit.

## 2023-12-28 NOTE — TELEPHONE ENCOUNTER
Patient called requesting an appointment with a provider to be seen for cold symptoms today.  Patient is not establish and no available provider appointment at this clinic location.  Patient agreed to go to the urgent care at [facility] to address cold symptoms.  Location and contact information provided.      Will route encounter to  to contact patient to schedule an appointment with Dr. Freitas to establish care.  Patient would like to establish care with a female provider at the [facility].  Patient contact phone verified to be accurate on file.     Clint Parrish RN  [facility]

## 2023-12-29 ENCOUNTER — TELEPHONE (OUTPATIENT)
Dept: FAMILY MEDICINE | Facility: CLINIC | Age: 58
End: 2023-12-29
Payer: COMMERCIAL

## 2023-12-29 LAB — SARS-COV-2 RNA RESP QL NAA+PROBE: NEGATIVE

## 2023-12-29 NOTE — TELEPHONE ENCOUNTER
----- Message from Neelima Day NP sent at 12/29/2023  2:04 PM CST -----  Please notify patient of negative covid  Neelima Day NP on 12/29/2023 at 2:04 PM

## 2023-12-29 NOTE — TELEPHONE ENCOUNTER
Called and relayed result to pt. Pt states understanding and has no questions.         Ben Stratton MA on 12/29/2023 at 2:16 PM

## 2024-01-29 ENCOUNTER — TELEPHONE (OUTPATIENT)
Dept: MAMMOGRAPHY | Facility: CLINIC | Age: 59
End: 2024-01-29
Payer: COMMERCIAL

## 2024-01-29 NOTE — TELEPHONE ENCOUNTER
Patient Quality Outreach    Patient is due for the following:   Breast Cancer Screening - Mammogram    Next Steps:   Schedule a office visit for mammo    Type of outreach:    Unable to LM, MB is full.    Next Steps:  Reach out within 90 days via Phone.    Max number of attempts reached: No. Will try again in 90 days if patient still on fail list.    Questions for provider review:    None           MENDY Cote  Chart routed to Care Team.       pt came in with low HB

## 2024-02-19 DIAGNOSIS — N95.1 SYMPTOMATIC MENOPAUSAL OR FEMALE CLIMACTERIC STATES: ICD-10-CM

## 2024-02-19 RX ORDER — GABAPENTIN 100 MG/1
CAPSULE ORAL
Qty: 90 CAPSULE | Refills: 0 | OUTPATIENT
Start: 2024-02-19

## 2024-02-20 RX ORDER — GABAPENTIN 100 MG/1
CAPSULE ORAL
Qty: 90 CAPSULE | Refills: 0 | Status: SHIPPED | OUTPATIENT
Start: 2024-02-20 | End: 2024-05-20

## 2024-05-02 ENCOUNTER — TELEPHONE (OUTPATIENT)
Dept: MAMMOGRAPHY | Facility: CLINIC | Age: 59
End: 2024-05-02
Payer: COMMERCIAL

## 2024-05-02 NOTE — TELEPHONE ENCOUNTER
Patient Quality Outreach    Patient is due for the following:   Breast Cancer Screening - Mammogram    Next Steps:   Schedule a office visit for mammo    Type of outreach:    Phone, spoke to patient/parent. Will schedule through agencyQ when she is ready.    Next Steps:  Reach out within 90 days via Phone.    Max number of attempts reached: Yes. Will try again in 90 days if patient still on fail list.    Questions for provider review:    None           MENDY Cote  Chart routed to Care Team.

## 2024-05-20 DIAGNOSIS — N95.1 SYMPTOMATIC MENOPAUSAL OR FEMALE CLIMACTERIC STATES: ICD-10-CM

## 2024-05-22 RX ORDER — GABAPENTIN 100 MG/1
CAPSULE ORAL
Qty: 90 CAPSULE | Refills: 3 | Status: SHIPPED | OUTPATIENT
Start: 2024-05-22

## 2024-07-19 DIAGNOSIS — G40.309 GENERALIZED CONVULSIVE EPILEPSY (H): ICD-10-CM

## 2024-07-19 DIAGNOSIS — Z76.0 ENCOUNTER FOR MEDICATION REFILL: ICD-10-CM

## 2024-07-19 RX ORDER — DIVALPROEX SODIUM 500 MG/1
500 TABLET, DELAYED RELEASE ORAL 2 TIMES DAILY
Qty: 60 TABLET | Refills: 0 | Status: SHIPPED | OUTPATIENT
Start: 2024-07-19 | End: 2024-08-02

## 2024-07-20 DIAGNOSIS — Z76.0 ENCOUNTER FOR MEDICATION REFILL: ICD-10-CM

## 2024-07-20 DIAGNOSIS — G40.309 GENERALIZED CONVULSIVE EPILEPSY (H): ICD-10-CM

## 2024-07-21 RX ORDER — DIVALPROEX SODIUM 500 MG/1
500 TABLET, DELAYED RELEASE ORAL 2 TIMES DAILY
Qty: 180 TABLET | OUTPATIENT
Start: 2024-07-21

## 2024-08-01 DIAGNOSIS — Z76.0 ENCOUNTER FOR MEDICATION REFILL: ICD-10-CM

## 2024-08-01 DIAGNOSIS — G40.309 GENERALIZED CONVULSIVE EPILEPSY (H): ICD-10-CM

## 2024-08-02 RX ORDER — DIVALPROEX SODIUM 500 MG/1
500 TABLET, DELAYED RELEASE ORAL 2 TIMES DAILY
Qty: 60 TABLET | Refills: 1 | Status: SHIPPED | OUTPATIENT
Start: 2024-08-02

## 2024-08-02 NOTE — TELEPHONE ENCOUNTER
Patient has establish care appointment scheduled for 9/24/24 with Dr. Phillips. 2 refills sent to cover until that time.  Gustavo Olson MD  Family Medicine with Obstetrics  [facility]  08/02/24  8:27 AM

## 2024-09-04 ENCOUNTER — TELEPHONE (OUTPATIENT)
Dept: MAMMOGRAPHY | Facility: CLINIC | Age: 59
End: 2024-09-04
Payer: COMMERCIAL

## 2024-09-04 NOTE — TELEPHONE ENCOUNTER
Patient Quality Outreach    Patient is due for the following:   Breast Cancer Screening - Mammogram    Next Steps:   Schedule a office visit for mammo    Type of outreach:    Phone, left message for patient/parent to call back.    Next Steps:  Reach out within 90 days via Phone.    Max number of attempts reached: No. Will try again in 90 days if patient still on fail list.    Questions for provider review:    None           MENDY Cote  Chart routed to Care Team.

## 2024-10-18 ENCOUNTER — TELEPHONE (OUTPATIENT)
Dept: FAMILY MEDICINE | Facility: CLINIC | Age: 59
End: 2024-10-18

## 2024-10-18 DIAGNOSIS — Z76.0 ENCOUNTER FOR MEDICATION REFILL: ICD-10-CM

## 2024-10-18 DIAGNOSIS — F33.1 MODERATE EPISODE OF RECURRENT MAJOR DEPRESSIVE DISORDER (H): ICD-10-CM

## 2024-10-18 RX ORDER — PAROXETINE 40 MG/1
40 TABLET, FILM COATED ORAL DAILY
Qty: 30 TABLET | Refills: 0 | Status: SHIPPED | OUTPATIENT
Start: 2024-10-18

## 2024-10-18 NOTE — TELEPHONE ENCOUNTER
Medication Question or Refill    Contacts       Contact Date/Time Type Contact Phone/Fax    10/18/2024 11:51 AM CDT Interface (Incoming) [id number] - [place], [place] - [address] AT SEC OF [facility] [phone]    10/18/2024 01:16 PM CDT Phone (Incoming) Ana Barrera (Self) [phone]            What medication are you calling about (include dose and sig)?: PARoxetine (PAXIL) 40 MG tablet     Preferred Pharmacy:   [id number] [address] [facility] [address] AT [address] OF [facility] [employer] [address]  Phone: [phone] Fax: [phone]    [id number] - [place], [place] - [address] AT SEC OF [facility]  [address]  [place] [address]  Phone: [phone] Fax: [phone]    [facility] #[id number] - [place], [place] - [address] AT [facility] [address]  [address]  [place] [address]  Phone: [phone] Fax: [phone]    [address] [address]  Phone: [phone] Fax: [phone]      Controlled Substance Agreement on file:   CSA -- Patient Level:    CSA: None found at the patient level.       Who prescribed the medication?:     Do you need a refill? Yes    When did you use the medication last?     Patient offered an appointment? Yes: Pt scheduled for Provider;s first available ob 02/11/2025 at 8:00 am    Do you have any questions or concerns?  Yes: If PCP can refill enough until appt. If Provider would like Pt to be seen sooner, Please call Pt back to schedule.       Okay to leave a detailed message?: Yes at Home number on file [phone] (home)

## 2024-10-18 NOTE — TELEPHONE ENCOUNTER
Patient calls back inquiring about status of refill request. Dr. Phillips patient has a future appt with you on 02/11/2025, will you be willing to refill patient medication? Writer inform caller message was sent to provider- awaiting for response. Writer will send provider another message. Caller verbalizes understanding.     Jeri Stratton,   [facility]  October 18, 2024 3:54 PM

## 2024-10-18 NOTE — LETTER
October 28, 2024      Ana Barrera  [address] [place] [address]        Nia Perez,     Staff have been trying to contact you in attempt to assist with scheduling an appointment for you to meet with a provider to continue refill of Paroxetine medication.   Please call our clinic at [phone] for appointment assistance.  Appointment can be made with any provider at their next availability.      Sincerely,        Clint MARION RN

## 2024-10-22 NOTE — TELEPHONE ENCOUNTER
Please call her and tell her I will see her 11/20/24 at 8:30am. She has to show up if she wants more refills. SHE ALSO MUST COME TO HER APPT IN FEB 2025.   GILMAR Phillips.      Rx was filled for 30 tablets. Patient is to schedule appt with Dr. Phillips for her next refill. Call patient and left message to call clinic back.      Inocencio Amezquita, BSN RN  [facility]

## 2024-10-28 NOTE — TELEPHONE ENCOUNTER
Called # 3 with no answer.  Message left on vm  with clinic number provided.  A letter sent to address on file via standard mail also sent since patient unable to reach x 3 calls.    Clint Parrish RN  [facility]

## 2024-10-31 ENCOUNTER — OFFICE VISIT (OUTPATIENT)
Dept: FAMILY MEDICINE | Facility: CLINIC | Age: 59
End: 2024-10-31
Payer: COMMERCIAL

## 2024-10-31 VITALS
SYSTOLIC BLOOD PRESSURE: 124 MMHG | RESPIRATION RATE: 14 BRPM | OXYGEN SATURATION: 97 % | HEIGHT: 65 IN | HEART RATE: 83 BPM | DIASTOLIC BLOOD PRESSURE: 86 MMHG | TEMPERATURE: 98 F | WEIGHT: 217 LBS | BODY MASS INDEX: 36.15 KG/M2

## 2024-10-31 DIAGNOSIS — G40.309 GENERALIZED CONVULSIVE EPILEPSY (H): ICD-10-CM

## 2024-10-31 DIAGNOSIS — R53.83 OTHER FATIGUE: ICD-10-CM

## 2024-10-31 DIAGNOSIS — R39.9 UTI SYMPTOMS: Primary | ICD-10-CM

## 2024-10-31 DIAGNOSIS — F33.1 MODERATE EPISODE OF RECURRENT MAJOR DEPRESSIVE DISORDER (H): ICD-10-CM

## 2024-10-31 DIAGNOSIS — N39.0 ACUTE UTI: ICD-10-CM

## 2024-10-31 LAB
ALBUMIN UR-MCNC: 30 MG/DL
APPEARANCE UR: CLEAR
BACTERIA #/AREA URNS HPF: ABNORMAL /HPF
BILIRUB UR QL STRIP: NEGATIVE
COLOR UR AUTO: YELLOW
ERYTHROCYTE [DISTWIDTH] IN BLOOD BY AUTOMATED COUNT: 14.2 % (ref 10–15)
EST. AVERAGE GLUCOSE BLD GHB EST-MCNC: 91 MG/DL
GLUCOSE UR STRIP-MCNC: NEGATIVE MG/DL
HBA1C MFR BLD: 4.8 % (ref 0–5.6)
HCT VFR BLD AUTO: 41.3 % (ref 35–47)
HGB BLD-MCNC: 13.9 G/DL (ref 11.7–15.7)
HGB UR QL STRIP: ABNORMAL
KETONES UR STRIP-MCNC: ABNORMAL MG/DL
LEUKOCYTE ESTERASE UR QL STRIP: ABNORMAL
MCH RBC QN AUTO: 31.4 PG (ref 26.5–33)
MCHC RBC AUTO-ENTMCNC: 33.7 G/DL (ref 31.5–36.5)
MCV RBC AUTO: 93 FL (ref 78–100)
NITRATE UR QL: NEGATIVE
PH UR STRIP: 7 [PH] (ref 5–8)
PLATELET # BLD AUTO: 201 10E3/UL (ref 150–450)
RBC # BLD AUTO: 4.43 10E6/UL (ref 3.8–5.2)
RBC #/AREA URNS AUTO: ABNORMAL /HPF
SP GR UR STRIP: 1.02 (ref 1–1.03)
SQUAMOUS #/AREA URNS AUTO: ABNORMAL /LPF
UROBILINOGEN UR STRIP-ACNC: 0.2 E.U./DL
WBC # BLD AUTO: 5.8 10E3/UL (ref 4–11)
WBC #/AREA URNS AUTO: >100 /HPF

## 2024-10-31 PROCEDURE — 83036 HEMOGLOBIN GLYCOSYLATED A1C: CPT | Performed by: FAMILY MEDICINE

## 2024-10-31 PROCEDURE — 80053 COMPREHEN METABOLIC PANEL: CPT | Performed by: FAMILY MEDICINE

## 2024-10-31 PROCEDURE — 85027 COMPLETE CBC AUTOMATED: CPT | Performed by: FAMILY MEDICINE

## 2024-10-31 PROCEDURE — 84443 ASSAY THYROID STIM HORMONE: CPT | Performed by: FAMILY MEDICINE

## 2024-10-31 PROCEDURE — 80164 ASSAY DIPROPYLACETIC ACD TOT: CPT | Performed by: FAMILY MEDICINE

## 2024-10-31 PROCEDURE — 87086 URINE CULTURE/COLONY COUNT: CPT | Performed by: FAMILY MEDICINE

## 2024-10-31 PROCEDURE — 36415 COLL VENOUS BLD VENIPUNCTURE: CPT | Performed by: FAMILY MEDICINE

## 2024-10-31 PROCEDURE — 80061 LIPID PANEL: CPT | Performed by: FAMILY MEDICINE

## 2024-10-31 PROCEDURE — 99214 OFFICE O/P EST MOD 30 MIN: CPT | Performed by: FAMILY MEDICINE

## 2024-10-31 PROCEDURE — 81001 URINALYSIS AUTO W/SCOPE: CPT | Performed by: FAMILY MEDICINE

## 2024-10-31 RX ORDER — NITROFURANTOIN 25; 75 MG/1; MG/1
100 CAPSULE ORAL 2 TIMES DAILY
Qty: 10 CAPSULE | Refills: 0 | Status: SHIPPED | OUTPATIENT
Start: 2024-10-31 | End: 2024-11-05

## 2024-10-31 NOTE — PROGRESS NOTES
"  Assessment & Plan     Acute UTI  Strong likelihood of uti  Culture pending.  Start med  - nitroFURantoin macrocrystal-monohydrate (MACROBID) 100 MG capsule  Dispense: 10 capsule; Refill: 0    UTI symptoms  - UA with Microscopic reflex to Culture  - UA Microscopic with Reflex to Culture  - Urine Culture    Other fatigue  Could be multifactorial, depression, EtOH or other.  Discussed labs and will proceed with:  - Comprehensive metabolic panel (BMP + Alb, Alk Phos, ALT, AST, Total. Bili, TP)  - TSH with free T4 reflex  - CBC with platelets  - Hemoglobin A1c  - Lipid panel reflex to direct LDL Non-fasting    Moderate episode of recurrent major depressive disorder (H)  On ssri for now and will continue  Look for other metabolic concerns.  - Comprehensive metabolic panel (BMP + Alb, Alk Phos, ALT, AST, Total. Bili, TP)  - TSH with free T4 reflex  - CBC with platelets  - Hemoglobin A1c  - Comprehensive metabolic panel (BMP + Alb, Alk Phos, ALT, AST, Total. Bili, TP)  - TSH with free T4 reflex  - CBC with platelets  - Hemoglobin A1c    Generalized convulsive epilepsy (H)  Consider neurology referral and whether or not to update to more modern medicine.  Should have labs for med levels and for metabolic screening as above.  - Valproic acid    See back for CPE soon.      BMI  Estimated body mass index is 36.15 kg/m  as calculated from the following:    Height as of this encounter: 1.65 m (5' 4.96\").    Weight as of this encounter: 98.4 kg (217 lb).       Darlene Perez is a 59 year old, presenting for the following health issues:  UTI        10/31/2024    10:30 AM   Additional Questions   Roomed by jeanie montoya   Accompanied by self     UTI    History of Present Illness       Reason for visit:  Bladder infection  Symptom onset:  1-3 days ago  Symptoms include:  Painfull and frequent urination  Symptom intensity:  Moderate  Symptom progression:  Worsening  Had these symptoms before:  Yes  Has tried/received treatment for these " "symptoms:  Yes  Previous treatment was successful:  Yes  Prior treatment description:  Medication   She is taking medications regularly.    Urine frequency last night.  Today, had dysuria.  Mild back pain--not sure related  No fevers or chills.  No hx kidney stones.  Has had before and feels like UTI.    Also, feels fatigue.  More long term.  Doesn't feel like she can get out of bed.  Some hx of depression  On ssri.    Also hx of seizures.  On depakote.    PMD left clinic system, so she has been without care for a while.   Many health screening things needed.        Objective    /86 (BP Location: Right arm, Patient Position: Sitting, Cuff Size: Adult Large)   Pulse 83   Temp 98  F (36.7  C) (Oral)   Resp 14   Ht 1.65 m (5' 4.96\")   Wt 98.4 kg (217 lb)   SpO2 97%   BMI 36.15 kg/m    Body mass index is 36.15 kg/m .  Physical Exam   GENERAL: alert, not distressed  CHEST: clear, no rales, rhonchi, or wheezes  CARDIAC: regular without murmur, gallop, or rub  ABDOMEN: soft, non tender, non distended, normal bowel sounds  BACK: no CVA or flank tenderness.      Results for orders placed or performed in visit on 10/31/24 (from the past 24 hours)   UA with Microscopic reflex to Culture    Specimen: Urine, Clean Catch   Result Value Ref Range    Color Urine Yellow Colorless, Straw, Light Yellow, Yellow    Appearance Urine Clear Clear    Glucose Urine Negative Negative mg/dL    Bilirubin Urine Negative Negative    Ketones Urine Trace (A) Negative mg/dL    Specific Gravity Urine 1.020 1.005 - 1.030    Blood Urine Moderate (A) Negative    pH Urine 7.0 5.0 - 8.0    Protein Albumin Urine 30 (A) Negative mg/dL    Urobilinogen Urine 0.2 0.2, 1.0 E.U./dL    Nitrite Urine Negative Negative    Leukocyte Esterase Urine Small (A) Negative   UA Microscopic with Reflex to Culture   Result Value Ref Range    Bacteria Urine Many (A) None Seen /HPF    RBC Urine 10-25 (A) 0-2 /HPF /HPF    WBC Urine >100 (A) 0-5 /HPF /HPF    " Squamous Epithelials Urine Moderate (A) None Seen /LPF   CBC with platelets   Result Value Ref Range    WBC Count 5.8 4.0 - 11.0 10e3/uL    RBC Count 4.43 3.80 - 5.20 10e6/uL    Hemoglobin 13.9 11.7 - 15.7 g/dL    Hematocrit 41.3 35.0 - 47.0 %    MCV 93 78 - 100 fL    MCH 31.4 26.5 - 33.0 pg    MCHC 33.7 31.5 - 36.5 g/dL    RDW 14.2 10.0 - 15.0 %    Platelet Count 201 150 - 450 10e3/uL   Hemoglobin A1c   Result Value Ref Range    Estimated Average Glucose 91 <117 mg/dL    Hemoglobin A1C 4.8 0.0 - 5.6 %           Signed Electronically by: Arden Cano MD Prior to immunization administration, verified patients identity using patient s name and date of birth. Please see Immunization Activity for additional information.     Screening Questionnaire for Adult Immunization    Are you sick today?   No   Do you have allergies to medications, food, a vaccine component or latex?   Yes   Have you ever had a serious reaction after receiving a vaccination?   No   Do you have a long-term health problem with heart, lung, kidney, or metabolic disease (e.g., diabetes), asthma, a blood disorder, no spleen, complement component deficiency, a cochlear implant, or a spinal fluid leak?  Are you on long-term aspirin therapy?   No   Do you have cancer, leukemia, HIV/AIDS, or any other immune system problem?   No   Do you have a parent, brother, or sister with an immune system problem?   No   In the past 3 months, have you taken medications that affect  your immune system, such as prednisone, other steroids, or anticancer drugs; drugs for the treatment of rheumatoid arthritis, Crohn s disease, or psoriasis; or have you had radiation treatments?   No   Have you had a seizure, or a brain or other nervous system problem?   No   During the past year, have you received a transfusion of blood or blood    products, or been given immune (gamma) globulin or antiviral drug?   No   For women: Are you pregnant or is there a chance you could become        pregnant during the next month?   No   Have you received any vaccinations in the past 4 weeks?   No     Immunization questionnaire was positive for at least one answer.  Notified       Patient instructed to remain in clinic for 15 minutes afterwards, and to report any adverse reactions.     Screening performed by Sisi Yost MA on 10/31/2024 at 10:34 AM.

## 2024-10-31 NOTE — LETTER
November 3, 2024      Ana Barrera  [address] [place] [address]        Dear ,    We are writing to inform you of your test results.    Kidney, liver, thyroid, diabetes tests all normal.  Blood counts normal.  Seizure medicine levels are on the lower end, but if you are not having seizures, the dose is ok..  Cholesterol is mildly elevated.  Work on healthy diet and exercise.  Consider seeing us again soon to check in on depression and mood.    Resulted Orders   UA with Microscopic reflex to Culture   Result Value Ref Range    Color Urine Yellow Colorless, Straw, Light Yellow, Yellow    Appearance Urine Clear Clear    Glucose Urine Negative Negative mg/dL    Bilirubin Urine Negative Negative    Ketones Urine Trace (A) Negative mg/dL    Specific Gravity Urine 1.020 1.005 - 1.030    Blood Urine Moderate (A) Negative    pH Urine 7.0 5.0 - 8.0    Protein Albumin Urine 30 (A) Negative mg/dL    Urobilinogen Urine 0.2 0.2, 1.0 E.U./dL    Nitrite Urine Negative Negative    Leukocyte Esterase Urine Small (A) Negative   UA Microscopic with Reflex to Culture   Result Value Ref Range    Bacteria Urine Many (A) None Seen /HPF    RBC Urine 10-25 (A) 0-2 /HPF /HPF    WBC Urine >100 (A) 0-5 /HPF /HPF    Squamous Epithelials Urine Moderate (A) None Seen /LPF   Urine Culture   Result Value Ref Range    Culture >100,000 CFU/mL Mixture of Urogenital Zayda     Narrative    Multiple morphotypes present with no predominant organism.  Growth consistent with probable contamination during collection.  Suggest repeat specimen if clinically indicated.    Comprehensive metabolic panel (BMP + Alb, Alk Phos, ALT, AST, Total. Bili, TP)   Result Value Ref Range    Sodium 139 135 - 145 mmol/L    Potassium 4.7 3.4 - 5.3 mmol/L    Carbon Dioxide (CO2) 25 22 - 29 mmol/L    Anion Gap 12 7 - 15 mmol/L    Urea Nitrogen 14.4 8.0 - 23.0 mg/dL    Creatinine 0.85 0.51 - 0.95 mg/dL    GFR Estimate 78 >60 mL/min/1.73m2      Comment:       eGFR calculated using 2021 CKD-EPI equation.    Calcium 9.7 8.8 - 10.4 mg/dL      Comment:      Reference intervals for this test were updated on 7/16/2024 to reflect our healthy population more accurately. There may be differences in the flagging of prior results with similar values performed with this method. Those prior results can be interpreted in the context of the updated reference intervals.    Chloride 102 98 - 107 mmol/L    Glucose 91 70 - 99 mg/dL    Alkaline Phosphatase 86 40 - 150 U/L    AST 27 0 - 45 U/L    ALT 46 0 - 50 U/L    Protein Total 7.2 6.4 - 8.3 g/dL    Albumin 4.7 3.5 - 5.2 g/dL    Bilirubin Total 0.5 <=1.2 mg/dL    Patient Fasting > 8hrs? No    TSH with free T4 reflex   Result Value Ref Range    TSH 2.95 0.30 - 4.20 uIU/mL   CBC with platelets   Result Value Ref Range    WBC Count 5.8 4.0 - 11.0 10e3/uL    RBC Count 4.43 3.80 - 5.20 10e6/uL    Hemoglobin 13.9 11.7 - 15.7 g/dL    Hematocrit 41.3 35.0 - 47.0 %    MCV 93 78 - 100 fL    MCH 31.4 26.5 - 33.0 pg    MCHC 33.7 31.5 - 36.5 g/dL    RDW 14.2 10.0 - 15.0 %    Platelet Count 201 150 - 450 10e3/uL   Hemoglobin A1c   Result Value Ref Range    Estimated Average Glucose 91 <117 mg/dL    Hemoglobin A1C 4.8 0.0 - 5.6 %      Comment:      Normal <5.7%   Prediabetes 5.7-6.4%    Diabetes 6.5% or higher     Note: Adopted from ADA consensus guidelines.   Valproic acid   Result Value Ref Range    Valproic acid 43.1 (L)   ug/mL      Comment:      Therapeutic Range:  ug/mL   Epilepsy and silke patients:  ug/mL   Some patients require and can tolerate values up to 150 ug/mL     Critical: Greater than 150 ug/mL   Lipid panel reflex to direct LDL Non-fasting   Result Value Ref Range    Cholesterol 255 (H) <200 mg/dL    Triglycerides 110 <150 mg/dL    Direct Measure HDL 76 >=50 mg/dL    LDL Cholesterol Calculated 157 (H) <100 mg/dL    Non HDL Cholesterol 179 (H) <130 mg/dL    Patient Fasting > 8hrs? No     Narrative     Cholesterol  Desirable: < 200 mg/dL  Borderline High: 200 - 239 mg/dL  High: >= 240 mg/dL    Triglycerides  Normal: < 150 mg/dL  Borderline High: 150 - 199 mg/dL  High: 200-499 mg/dL  Very High: >= 500 mg/dL    Direct Measure HDL  Female: >= 50 mg/dL   Male: >= 40 mg/dL    LDL Cholesterol  Desirable: < 100 mg/dL  Above Desirable: 100 - 129 mg/dL   Borderline High: 130 - 159 mg/dL   High:  160 - 189 mg/dL   Very High: >= 190 mg/dL    Non HDL Cholesterol  Desirable: < 130 mg/dL  Above Desirable: 130 - 159 mg/dL  Borderline High: 160 - 189 mg/dL  High: 190 - 219 mg/dL  Very High: >= 220 mg/dL       If you have any questions or concerns, please call the clinic at the number listed above.       Sincerely,      Arden Cano MD

## 2024-11-01 LAB
ALBUMIN SERPL BCG-MCNC: 4.7 G/DL (ref 3.5–5.2)
ALP SERPL-CCNC: 86 U/L (ref 40–150)
ALT SERPL W P-5'-P-CCNC: 46 U/L (ref 0–50)
ANION GAP SERPL CALCULATED.3IONS-SCNC: 12 MMOL/L (ref 7–15)
AST SERPL W P-5'-P-CCNC: 27 U/L (ref 0–45)
BACTERIA UR CULT: NORMAL
BILIRUB SERPL-MCNC: 0.5 MG/DL
BUN SERPL-MCNC: 14.4 MG/DL (ref 8–23)
CALCIUM SERPL-MCNC: 9.7 MG/DL (ref 8.8–10.4)
CHLORIDE SERPL-SCNC: 102 MMOL/L (ref 98–107)
CHOLEST SERPL-MCNC: 255 MG/DL
CREAT SERPL-MCNC: 0.85 MG/DL (ref 0.51–0.95)
EGFRCR SERPLBLD CKD-EPI 2021: 78 ML/MIN/1.73M2
FASTING STATUS PATIENT QL REPORTED: NO
FASTING STATUS PATIENT QL REPORTED: NO
GLUCOSE SERPL-MCNC: 91 MG/DL (ref 70–99)
HCO3 SERPL-SCNC: 25 MMOL/L (ref 22–29)
HDLC SERPL-MCNC: 76 MG/DL
LDLC SERPL CALC-MCNC: 157 MG/DL
NONHDLC SERPL-MCNC: 179 MG/DL
POTASSIUM SERPL-SCNC: 4.7 MMOL/L (ref 3.4–5.3)
PROT SERPL-MCNC: 7.2 G/DL (ref 6.4–8.3)
SODIUM SERPL-SCNC: 139 MMOL/L (ref 135–145)
TRIGL SERPL-MCNC: 110 MG/DL
TSH SERPL DL<=0.005 MIU/L-ACNC: 2.95 UIU/ML (ref 0.3–4.2)
VALPROATE SERPL-MCNC: 43.1 UG/ML

## 2024-11-04 ENCOUNTER — TELEPHONE (OUTPATIENT)
Dept: FAMILY MEDICINE | Facility: CLINIC | Age: 59
End: 2024-11-04
Payer: COMMERCIAL

## 2024-11-04 NOTE — TELEPHONE ENCOUNTER
RN attempted to contact patient, but no answer. Left message on patient's voice mail to call clinic back.    If patient calls back, please relay message below. Thanks    Hue Rivera RN  [facility]    ----- Message from Arden Cano sent at 11/3/2024  6:37 AM CST -----  Call:  Kidney, liver, thyroid, diabetes tests all normal.  Blood counts normal.  Seizure medicine levels are on the lower end, but if you are not having seizures, the dose is ok..  Cholesterol is mildly elevated.  Work on healthy diet and exercise.  Consider seeing us again soon to check in on depression and mood.

## 2024-11-06 NOTE — TELEPHONE ENCOUNTER
Attempt #2: No answer. LVM to call clinic. Please relay clinician's message below upon return call.    Bernard BARRIOS RN  [facility]

## 2024-11-07 NOTE — TELEPHONE ENCOUNTER
Writer spoke with patient and relayed message from Dr. Cano regarding results.   Patient states no issues with seizures at this time.     ADDY SelfN RN  [facility]

## 2024-11-14 DIAGNOSIS — Z76.0 ENCOUNTER FOR MEDICATION REFILL: ICD-10-CM

## 2024-11-14 DIAGNOSIS — N95.1 SYMPTOMATIC MENOPAUSAL OR FEMALE CLIMACTERIC STATES: ICD-10-CM

## 2024-11-14 DIAGNOSIS — F33.1 MODERATE EPISODE OF RECURRENT MAJOR DEPRESSIVE DISORDER (H): ICD-10-CM

## 2024-11-14 RX ORDER — GABAPENTIN 100 MG/1
CAPSULE ORAL
Qty: 90 CAPSULE | Refills: 3 | Status: SHIPPED | OUTPATIENT
Start: 2024-11-14

## 2024-11-15 RX ORDER — PAROXETINE 40 MG/1
40 TABLET, FILM COATED ORAL DAILY
Qty: 90 TABLET | Refills: 0 | Status: SHIPPED | OUTPATIENT
Start: 2024-11-15

## 2024-12-01 ENCOUNTER — HEALTH MAINTENANCE LETTER (OUTPATIENT)
Age: 59
End: 2024-12-01

## 2024-12-12 ENCOUNTER — OFFICE VISIT (OUTPATIENT)
Dept: FAMILY MEDICINE | Facility: CLINIC | Age: 59
End: 2024-12-12
Payer: COMMERCIAL

## 2024-12-12 VITALS
BODY MASS INDEX: 37.9 KG/M2 | SYSTOLIC BLOOD PRESSURE: 132 MMHG | DIASTOLIC BLOOD PRESSURE: 84 MMHG | HEIGHT: 64 IN | RESPIRATION RATE: 16 BRPM | OXYGEN SATURATION: 96 % | WEIGHT: 222 LBS | TEMPERATURE: 97 F | HEART RATE: 80 BPM

## 2024-12-12 DIAGNOSIS — R05.2 SUBACUTE COUGH: ICD-10-CM

## 2024-12-12 DIAGNOSIS — J02.9 SORE THROAT: Primary | ICD-10-CM

## 2024-12-12 LAB
DEPRECATED S PYO AG THROAT QL EIA: NEGATIVE
S PYO DNA THROAT QL NAA+PROBE: NOT DETECTED

## 2024-12-12 PROCEDURE — 87651 STREP A DNA AMP PROBE: CPT | Performed by: PHYSICIAN ASSISTANT

## 2024-12-12 PROCEDURE — 99213 OFFICE O/P EST LOW 20 MIN: CPT | Performed by: PHYSICIAN ASSISTANT

## 2024-12-12 RX ORDER — DOXYCYCLINE HYCLATE 100 MG
100 TABLET ORAL 2 TIMES DAILY
Qty: 20 TABLET | Refills: 0 | Status: SHIPPED | OUTPATIENT
Start: 2024-12-12 | End: 2024-12-22

## 2024-12-12 RX ORDER — CODEINE PHOSPHATE AND GUAIFENESIN 10; 100 MG/5ML; MG/5ML
1-2 SOLUTION ORAL EVERY 4 HOURS PRN
Qty: 118 ML | Refills: 0 | Status: SHIPPED | OUTPATIENT
Start: 2024-12-12

## 2024-12-12 ASSESSMENT — PATIENT HEALTH QUESTIONNAIRE - PHQ9
SUM OF ALL RESPONSES TO PHQ QUESTIONS 1-9: 15
10. IF YOU CHECKED OFF ANY PROBLEMS, HOW DIFFICULT HAVE THESE PROBLEMS MADE IT FOR YOU TO DO YOUR WORK, TAKE CARE OF THINGS AT HOME, OR GET ALONG WITH OTHER PEOPLE: SOMEWHAT DIFFICULT
SUM OF ALL RESPONSES TO PHQ QUESTIONS 1-9: 15

## 2024-12-12 ASSESSMENT — ENCOUNTER SYMPTOMS
COUGH: 1
SORE THROAT: 1

## 2024-12-12 NOTE — PROGRESS NOTES
Subjective:    Ana Barrera is a 59 year old female who presents with chief complaint of sore throat and cough.  She has had a sore throat for 2 weeks.  Also developed a cough.  She feels like symptoms are getting worse.  Cough continues to be productive and she is coughing up greenish phlegm.  She feels like the glands in her throat are sore.  Sometimes it is hard for her to take deep breaths.  No wheezing.  No known sick contacts.  She did not take any home COVID test.  Cough definitely seems to be worse at night.  She has tried to prop herself up in bed to decrease the cough, but then that overall makes it harder for her to sleep.  She has used Cheratussin cough medicine in the past and that has been helpful for her.  No fevers.    Reason for visit:  Cough & sore throat  Symptom onset:  1-2 weeks ago  Symptom intensity:  Severe  Symptom progression:  Worsening  Had these symptoms before:  Yes  Has tried/received treatment for these symptoms:  No She is missing 1 dose(s) of medications per week.  She is not taking prescribed medications regularly due to remembering to take.       Patient Active Problem List   Diagnosis    Generalized Convulsive Grand Mal Seizure    Amenorrhea    Menopausal syndrome (hot flashes)    Other insomnia    Other fatigue    Moderate episode of recurrent major depressive disorder (H)       Current Outpatient Medications:     albuterol (PROAIR HFA/PROVENTIL HFA/VENTOLIN HFA) 108 (90 Base) MCG/ACT inhaler, Inhale 1-2 puffs into the lungs every 4 hours as needed for shortness of breath, wheezing or cough, Disp: 18 g, Rfl: 0    Divalproex Sodium (DEPAKOTE PO), , Disp: , Rfl:     divalproex sodium delayed-release (DEPAKOTE) 500 MG DR tablet, TAKE 1 TABLET BY MOUTH TWICE DAILY., Disp: 60 tablet, Rfl: 1    gabapentin (NEURONTIN) 100 MG capsule, TAKE 3 CAPSULES BY MOUTH AT BEDTIME AS NEEDED FOR SLEEP, Disp: 90 capsule, Rfl: 3    guaiFENesin-codeine (ROBITUSSIN AC) 100-10 MG/5ML solution, Take  "5-10 mLs by mouth every 4 hours as needed for cough, Disp: 236 mL, Rfl: 0    PARoxetine (PAXIL) 40 MG tablet, TAKE 1 TABLET BY MOUTH DAILY, Disp: 90 tablet, Rfl: 0      Objective:   Allergies:  Penicillins    Vitals:  Vitals:    12/12/24 1403   BP: 132/84   BP Location: Left arm   Patient Position: Sitting   Cuff Size: Adult Large   Pulse: 80   Resp: 16   Temp: 97  F (36.1  C)   TempSrc: Temporal   SpO2: 96%   Weight: 100.7 kg (222 lb)   Height: 1.626 m (5' 4\")       Body mass index is 38.11 kg/m .    Vital signs reviewed.  General: Patient is alert and oriented x 3, in no apparent distress  Ears: TMs are non-erythematous with good light reflex bilaterally  Throat: no erythema, edema or exudate noted  Lymphatic: no anterior cervical lymph node enlargement  Cardiac: regular rate and rhythm***, no murmurs  Pulmonary: Very slight wheezing in the left upper lobe, otherwise lung exam is normal, no crackles rales or rhonchi         Assessment and Plan:   1. Sore throat (Primary)  2. Subacute cough  New concern.  Symptoms for 2 weeks, getting worse.  She has tried and failed conservative management.  Given her lack of symptom improvement, prescription sent for doxycycline today.  Rapid strep test was negative, strep culture is pending and I will follow-up with results.  We discussed that if this were COVID she is outside the 10-day infectious.,  Also outside treatment window, so we do not necessarily need to test for that unless she wants to.  She declines testing.  Cheratussin cough medicine sent for her as well.  She has tolerated this in the past without problems.  We discussed other continued symptomatic treatment.  If she is not feeling significantly better by the time she finishes the antibiotic she will contact us.  Let us know sooner if worsening or other concerns.  - Streptococcus A Rapid Screen w/Reflex to PCR - Clinic Collect  - Group A Streptococcus PCR Throat Swab  - doxycycline hyclate (VIBRA-TABS) 100 MG " tablet; Take 1 tablet (100 mg) by mouth 2 times daily for 10 days.  Dispense: 20 tablet; Refill: 0  - guaiFENesin-codeine (ROBITUSSIN AC) 100-10 MG/5ML solution; Take 5-10 mLs by mouth every 4 hours as needed for cough.  Dispense: 118 mL; Refill: 0      This dictation uses voice recognition software, which may contain typographical errors.

## 2024-12-22 ASSESSMENT — ENCOUNTER SYMPTOMS
SORE THROAT: 1
COUGH: 1

## 2025-01-02 ENCOUNTER — TELEPHONE (OUTPATIENT)
Dept: FAMILY MEDICINE | Facility: CLINIC | Age: 60
End: 2025-01-02

## 2025-01-02 ENCOUNTER — OFFICE VISIT (OUTPATIENT)
Dept: URGENT CARE | Facility: URGENT CARE | Age: 60
End: 2025-01-02
Payer: COMMERCIAL

## 2025-01-02 VITALS
DIASTOLIC BLOOD PRESSURE: 90 MMHG | TEMPERATURE: 98.6 F | BODY MASS INDEX: 36.73 KG/M2 | OXYGEN SATURATION: 98 % | HEART RATE: 88 BPM | RESPIRATION RATE: 19 BRPM | SYSTOLIC BLOOD PRESSURE: 148 MMHG | WEIGHT: 214 LBS

## 2025-01-02 DIAGNOSIS — R05.3 PERSISTENT COUGH FOR 3 WEEKS OR LONGER: Primary | ICD-10-CM

## 2025-01-02 RX ORDER — PREDNISONE 20 MG/1
20 TABLET ORAL DAILY
Qty: 5 TABLET | Refills: 0 | Status: SHIPPED | OUTPATIENT
Start: 2025-01-02

## 2025-01-02 RX ORDER — AZITHROMYCIN 250 MG/1
TABLET, FILM COATED ORAL
Qty: 6 TABLET | Refills: 0 | Status: SHIPPED | OUTPATIENT
Start: 2025-01-02 | End: 2025-01-07

## 2025-01-02 RX ORDER — BENZONATATE 200 MG/1
200 CAPSULE ORAL 3 TIMES DAILY PRN
Qty: 30 CAPSULE | Refills: 0 | Status: SHIPPED | OUTPATIENT
Start: 2025-01-02

## 2025-01-02 RX ORDER — ALBUTEROL SULFATE 90 UG/1
2 INHALANT RESPIRATORY (INHALATION) EVERY 6 HOURS PRN
Qty: 18 G | Refills: 0 | Status: SHIPPED | OUTPATIENT
Start: 2025-01-02

## 2025-01-02 NOTE — PROGRESS NOTES
"  Assessment & Plan   Problem List Items Addressed This Visit    None  Visit Diagnoses       Persistent cough for 3 weeks or longer    -  Primary    Relevant Medications    azithromycin (ZITHROMAX) 250 MG tablet    predniSONE (DELTASONE) 20 MG tablet    albuterol (PROAIR HFA/PROVENTIL HFA/VENTOLIN HFA) 108 (90 Base) MCG/ACT inhaler    benzonatate (TESSALON) 200 MG capsule    Other Relevant Orders    B. pertussis/parapertussis PCR-NP        Recommend nasal rinse, otc cough medication. Patient advised if symptoms persist, worsen or new symptoms arise they are to seek medical care.  All patients questions addressed. Patient verbalized understanding and agreement with plan.           BMI  Estimated body mass index is 36.73 kg/m  as calculated from the following:    Height as of 12/12/24: 1.626 m (5' 4\").    Weight as of this encounter: 97.1 kg (214 lb).           No follow-ups on file.      Darlene Perez is a 59 year old, presenting for the following health issues:  Cough (Over a month) and Nasal Congestion        12/12/2024     2:03 PM   Additional Questions   Roomed by HSER   Accompanied by SELF     HPI               Review of Systems  Constitutional, HEENT, cardiovascular, pulmonary, GI, , musculoskeletal, neuro, skin, endocrine and psych systems are negative, except as otherwise noted.      Objective    BP (!) 148/90   Pulse 88   Temp 98.6  F (37  C)   Resp 19   Wt 97.1 kg (214 lb)   LMP  (LMP Unknown)   SpO2 98%   BMI 36.73 kg/m    Body mass index is 36.73 kg/m .  Physical Exam   GENERAL: alert and no distress  EYES: Eyes grossly normal to inspection,  conjunctivae and sclerae normal  HENT: ear canals and TM's normal, nose and mouth without ulcers or lesions  NECK: no adenopathy   RESP: lungs clear to auscultation - no rales, rhonchi, wheezing noted and persistent cough throughout exam  CV: regular rate and rhythm, normal S1 S2   PSYCH: mentation appears normal, affect normal/bright            Signed " Electronically by: Zayda Head, NP

## 2025-01-02 NOTE — TELEPHONE ENCOUNTER
Called and discussed with patient. She is agreeable with urgent care as we have no appointments in clinic until next Tuesday (5 days from now).

## 2025-01-02 NOTE — TELEPHONE ENCOUNTER
Agree, should be seen either urgent care or using same-day hold.  May need chest x-ray.  Okay to use one of my same-day holds next week if that makes sense, perhaps trying to find another provider tomorrow would be best, or, of course, urgent care

## 2025-01-02 NOTE — TELEPHONE ENCOUNTER
Reason for Call:  Appointment Request    Patient requesting this type of appt: Saw doctor about cough/cold in 12/2024, still have cough and antibiotics not working. Also need refill on cough syrup.    Requested provider: Dr. Arden Cano or Aida Carreon    Reason patient unable to be scheduled: Not within requested timeframe    When does patient want to be seen/preferred time: Same day or ASAP    Comments: Saw doctor about cough/cold in 12/2024, still have cough and antibiotics not working. Also need refill on cough syrup. Please call about earliest available appointment. Patient prefers a text message if unable to reach her, as she has no voicemail setup.    Could we send this information to you in [employer] or would you prefer to receive a phone call?:   Patient would prefer a phone call   Okay to leave a detailed message?: No at Cell number on file:    Telephone Information:   Mobile [phone]       Call taken on 1/2/2025 at 1:18 PM by Evelyn Pierce

## 2025-01-06 ENCOUNTER — TELEPHONE (OUTPATIENT)
Dept: FAMILY MEDICINE | Facility: CLINIC | Age: 60
End: 2025-01-06
Payer: COMMERCIAL

## 2025-01-06 NOTE — TELEPHONE ENCOUNTER
Medication Question or Refill        What medication are you calling about (include dose and sig)?: cough with codeine     Preferred Pharmacy:     [id number] - [place], [place] - [address]  [address]  [place] [address]  Phone: [phone] Fax: [phone]      Controlled Substance Agreement on file:   CSA -- Patient Level:    CSA: None found at the patient level.       Who prescribed the medication?: Urgent Care and PCP    Do you need a refill? Yes    When did you use the medication last? About 5 days ago    Patient offered an appointment? No    Do you have any questions or concerns?  Yes: cough persisting with no relief; unable to sleep      Could we send this information to you in [employer] or would you prefer to receive a phone call?:   Patient would prefer a phone call   Okay to leave a detailed message?: N/A (does not receive voicemails) at Home number on file [phone] ([place])

## 2025-01-07 NOTE — TELEPHONE ENCOUNTER
Patient calling regarding cough. Pt saw Zayda Head NP at  on 1/2/25.  Pt reported Zayda was going to prescribe a cough syrup but did not get prescription.   Pt also reported tessalon does not work. Pt finished azithromycin and did not work. Cough syrup with codeine has helped in the past.     Routed to  Provider Pool.    Bernard BARRIOS RN  [facility]

## 2025-01-09 ENCOUNTER — OFFICE VISIT (OUTPATIENT)
Dept: URGENT CARE | Facility: URGENT CARE | Age: 60
End: 2025-01-09
Payer: COMMERCIAL

## 2025-01-09 VITALS
OXYGEN SATURATION: 98 % | TEMPERATURE: 98.3 F | DIASTOLIC BLOOD PRESSURE: 79 MMHG | WEIGHT: 214 LBS | RESPIRATION RATE: 16 BRPM | BODY MASS INDEX: 36.73 KG/M2 | SYSTOLIC BLOOD PRESSURE: 155 MMHG | HEART RATE: 83 BPM

## 2025-01-09 DIAGNOSIS — R05.3 PERSISTENT COUGH: Primary | ICD-10-CM

## 2025-01-09 DIAGNOSIS — J98.01 ACUTE BRONCHOSPASM: ICD-10-CM

## 2025-01-09 PROCEDURE — 94640 AIRWAY INHALATION TREATMENT: CPT | Performed by: FAMILY MEDICINE

## 2025-01-09 PROCEDURE — 99214 OFFICE O/P EST MOD 30 MIN: CPT | Mod: 25 | Performed by: FAMILY MEDICINE

## 2025-01-09 RX ORDER — FAMOTIDINE 40 MG/1
40 TABLET, FILM COATED ORAL 2 TIMES DAILY
Qty: 60 TABLET | Refills: 0 | Status: SHIPPED | OUTPATIENT
Start: 2025-01-09

## 2025-01-09 RX ORDER — ALBUTEROL SULFATE 0.83 MG/ML
2.5 SOLUTION RESPIRATORY (INHALATION) ONCE
Status: COMPLETED | OUTPATIENT
Start: 2025-01-09 | End: 2025-01-09

## 2025-01-09 RX ORDER — PREDNISONE 20 MG/1
TABLET ORAL
Qty: 15 TABLET | Refills: 0 | Status: SHIPPED | OUTPATIENT
Start: 2025-01-09

## 2025-01-09 RX ORDER — CODEINE PHOSPHATE AND GUAIFENESIN 10; 100 MG/5ML; MG/5ML
1-2 SOLUTION ORAL
Qty: 118 ML | Refills: 0 | Status: SHIPPED | OUTPATIENT
Start: 2025-01-09

## 2025-01-09 RX ADMIN — ALBUTEROL SULFATE 2.5 MG: 0.83 SOLUTION RESPIRATORY (INHALATION) at 15:54

## 2025-01-09 NOTE — TELEPHONE ENCOUNTER
Patient calling back to check on the status of this refill request. I transferred patient to the [facility] to further help assist her.

## 2025-01-10 NOTE — PROGRESS NOTES
Assessment/Plan:   1. Persistent cough (Primary)  ***  - albuterol (PROVENTIL) neb solution 2.5 mg  - Optichamber/Spacer Order for DME - ONLY FOR DME  - guaiFENesin-codeine (ROBITUSSIN AC) 100-10 MG/5ML solution; Take 5-10 mLs by mouth at bedtime as needed, may repeat once for cough.  Dispense: 118 mL; Refill: 0  - predniSONE (DELTASONE) 20 MG tablet; 40mg daily for 5 days then 20mg daily for 5 days  Dispense: 15 tablet; Refill: 0  - famotidine (PEPCID) 40 MG tablet; Take 1 tablet (40 mg) by mouth 2 times daily.  Dispense: 60 tablet; Refill: 0    2. Acute bronchospasm  ***    Albuterol inhaler with spacer - 2 puffs every 4 hours as needed for cough, wheeze or shortness of breath.     Continue steam, nasal saline, consider flonase, humidifier.   Gargles or lozenges for mucus in throat.     Cough syrup with codeine at night if needed for severe cough.     Pepcid twice daily for 2-4 weeks for GI protection and possible reflux trigger of cough.     If not calming in a few days start the prednisone - take with food 40mg daily for 5 days and if needed, subsequent 20mg daily for 5 days.     Recheck as needed.     I discussed red flag symptoms, return precautions to clinic/ER and follow up care with patient/parent.  Expected clinical course, symptomatic cares advised. Questions answered. Patient/parent amenable with plan.        Subjective:     Ana Barrera is a 59 year old female who presents ***    Allergies   Allergen Reactions    Penicillins Rash     Current Outpatient Medications   Medication Sig Dispense Refill    albuterol (PROAIR HFA/PROVENTIL HFA/VENTOLIN HFA) 108 (90 Base) MCG/ACT inhaler Inhale 2 puffs into the lungs every 6 hours as needed for shortness of breath, wheezing or cough. 18 g 0    benzonatate (TESSALON) 200 MG capsule Take 1 capsule (200 mg) by mouth 3 times daily as needed for cough. 30 capsule 0    famotidine (PEPCID) 40 MG tablet Take 1 tablet (40 mg) by mouth 2 times daily. 60 tablet 0     guaiFENesin-codeine (ROBITUSSIN AC) 100-10 MG/5ML solution Take 5-10 mLs by mouth at bedtime as needed, may repeat once for cough. 118 mL 0    predniSONE (DELTASONE) 20 MG tablet 40mg daily for 5 days then 20mg daily for 5 days 15 tablet 0    Divalproex Sodium (DEPAKOTE PO)  (Patient not taking: Reported on 1/9/2025)      divalproex sodium delayed-release (DEPAKOTE) 500 MG DR tablet TAKE 1 TABLET BY MOUTH TWICE DAILY. (Patient not taking: Reported on 1/9/2025) 60 tablet 1    gabapentin (NEURONTIN) 100 MG capsule TAKE 3 CAPSULES BY MOUTH AT BEDTIME AS NEEDED FOR SLEEP (Patient not taking: Reported on 1/9/2025) 90 capsule 3    PARoxetine (PAXIL) 40 MG tablet TAKE 1 TABLET BY MOUTH DAILY (Patient not taking: Reported on 1/9/2025) 90 tablet 0    predniSONE (DELTASONE) 20 MG tablet Take 1 tablet (20 mg) by mouth daily. (Patient not taking: Reported on 1/9/2025) 5 tablet 0     No current facility-administered medications for this visit.     Patient Active Problem List   Diagnosis    Generalized Convulsive Grand Mal Seizure    Amenorrhea    Menopausal syndrome (hot flashes)    Other insomnia    Other fatigue    Moderate episode of recurrent major depressive disorder (H)       Objective:     /79   Pulse 83   Temp 98.3  F (36.8  C) (Oral)   Resp 16   Wt 97.1 kg (214 lb)   LMP  (LMP Unknown)   SpO2 98%   BMI 36.73 kg/m      Physical        No results found for any visits on 01/09/25.    This note has been dictated in part using voice recognition software.  Any grammatical or context distortions are unintentional and inherent to the software.  Please feel free to contact me directly for clarification if needed.     1/9/2025) 90 capsule 3    PARoxetine (PAXIL) 40 MG tablet TAKE 1 TABLET BY MOUTH DAILY (Patient not taking: Reported on 1/9/2025) 90 tablet 0    predniSONE (DELTASONE) 20 MG tablet Take 1 tablet (20 mg) by mouth daily. (Patient not taking: Reported on 1/9/2025) 5 tablet 0     No current facility-administered medications for this visit.     Patient Active Problem List   Diagnosis    Generalized Convulsive Grand Mal Seizure    Amenorrhea    Menopausal syndrome (hot flashes)    Other insomnia    Other fatigue    Moderate episode of recurrent major depressive disorder (H)       Objective:     /79   Pulse 83   Temp 98.3  F (36.8  C) (Oral)   Resp 16   Wt 97.1 kg (214 lb)   LMP  (LMP Unknown)   SpO2 98%   BMI 36.73 kg/m      Physical    General Appearance: Alert, pleasant, no distress, aVSS  Head: Normocephalic, without obvious abnormality, atraumatic  Eyes: Conjunctivae are normal.   Ears: Normal TMs and external ear canals, both ears  Nose: No significant congestion.  Throat: Throat is normal.  No exudate.  No vesicular lesions  Neck: No adenopathy  Lungs: Clear to auscultation bilaterally, respirations unlabored, deep breaths trigger cough, prolonged exp phase.   Heart: Regular rate and rhythm  Psychiatric: Patient has a normal mood and affect.             This note has been dictated in part using voice recognition software.  Any grammatical or context distortions are unintentional and inherent to the software.  Please feel free to contact me directly for clarification if needed.

## 2025-01-10 NOTE — PATIENT INSTRUCTIONS
Albuterol inhaler with spacer - 2 puffs every 4 hours as needed for cough, wheeze or shortness of breath.     Continue steam, nasal saline, consider flonase, humidifier.   Gargles or lozenges for mucus in throat.     Cough syrup with codeine at night if needed for severe cough.     Pepcid twice daily for 2-4 weeks for GI protection and possible reflux trigger of cough.     If not calming in a few days start the prednisone - take with food 40mg daily for 5 days and if needed, subsequent 20mg daily for 5 days.     Recheck as needed.

## 2025-01-13 ENCOUNTER — TELEPHONE (OUTPATIENT)
Dept: FAMILY MEDICINE | Facility: CLINIC | Age: 60
End: 2025-01-13
Payer: COMMERCIAL

## 2025-01-28 NOTE — TELEPHONE ENCOUNTER
"Patient Quality Outreach    Patient is due for the following:   Breast Cancer Screening - Mammogram    Action(s) Taken:   Schedule a Adult Preventative    Type of outreach:    Tried to call patient back but the greeting to her voice mail only states \"zero.\"  Did not leave a message.    Questions for provider review:    None           Andrea Behrend        "

## 2025-02-03 DIAGNOSIS — Z76.0 ENCOUNTER FOR MEDICATION REFILL: ICD-10-CM

## 2025-02-03 DIAGNOSIS — F33.1 MODERATE EPISODE OF RECURRENT MAJOR DEPRESSIVE DISORDER (H): ICD-10-CM

## 2025-02-10 ENCOUNTER — ANCILLARY PROCEDURE (OUTPATIENT)
Dept: GENERAL RADIOLOGY | Facility: CLINIC | Age: 60
End: 2025-02-10
Attending: PHYSICIAN ASSISTANT
Payer: COMMERCIAL

## 2025-02-10 ENCOUNTER — OFFICE VISIT (OUTPATIENT)
Dept: URGENT CARE | Facility: URGENT CARE | Age: 60
End: 2025-02-10
Payer: COMMERCIAL

## 2025-02-10 VITALS
WEIGHT: 210 LBS | TEMPERATURE: 98.4 F | HEART RATE: 105 BPM | RESPIRATION RATE: 16 BRPM | DIASTOLIC BLOOD PRESSURE: 80 MMHG | OXYGEN SATURATION: 96 % | SYSTOLIC BLOOD PRESSURE: 127 MMHG | BODY MASS INDEX: 36.05 KG/M2

## 2025-02-10 DIAGNOSIS — J18.9 PNEUMONIA OF LEFT LOWER LOBE DUE TO INFECTIOUS ORGANISM: Primary | ICD-10-CM

## 2025-02-10 DIAGNOSIS — R05.1 ACUTE COUGH: ICD-10-CM

## 2025-02-10 LAB
C PNEUM DNA SPEC QL NAA+PROBE: NOT DETECTED
FLUAV AG SPEC QL IA: NEGATIVE
FLUAV H1 2009 PAND RNA SPEC QL NAA+PROBE: NOT DETECTED
FLUAV H1 RNA SPEC QL NAA+PROBE: NOT DETECTED
FLUAV H3 RNA SPEC QL NAA+PROBE: NOT DETECTED
FLUAV RNA SPEC QL NAA+PROBE: NOT DETECTED
FLUBV AG SPEC QL IA: NEGATIVE
FLUBV RNA SPEC QL NAA+PROBE: NOT DETECTED
HADV DNA SPEC QL NAA+PROBE: NOT DETECTED
HCOV PNL SPEC NAA+PROBE: NOT DETECTED
HMPV RNA SPEC QL NAA+PROBE: NOT DETECTED
HPIV1 RNA SPEC QL NAA+PROBE: NOT DETECTED
HPIV2 RNA SPEC QL NAA+PROBE: NOT DETECTED
HPIV3 RNA SPEC QL NAA+PROBE: NOT DETECTED
HPIV4 RNA SPEC QL NAA+PROBE: NOT DETECTED
M PNEUMO DNA SPEC QL NAA+PROBE: NOT DETECTED
RSV RNA SPEC QL NAA+PROBE: NOT DETECTED
RSV RNA SPEC QL NAA+PROBE: NOT DETECTED
RV+EV RNA SPEC QL NAA+PROBE: NOT DETECTED

## 2025-02-10 PROCEDURE — 87804 INFLUENZA ASSAY W/OPTIC: CPT | Mod: 59 | Performed by: PHYSICIAN ASSISTANT

## 2025-02-10 PROCEDURE — 87486 CHLMYD PNEUM DNA AMP PROBE: CPT | Performed by: PHYSICIAN ASSISTANT

## 2025-02-10 PROCEDURE — 71046 X-RAY EXAM CHEST 2 VIEWS: CPT | Mod: TC | Performed by: RADIOLOGY

## 2025-02-10 PROCEDURE — 99214 OFFICE O/P EST MOD 30 MIN: CPT | Performed by: PHYSICIAN ASSISTANT

## 2025-02-10 PROCEDURE — 87633 RESP VIRUS 12-25 TARGETS: CPT | Performed by: PHYSICIAN ASSISTANT

## 2025-02-10 PROCEDURE — 87581 M.PNEUMON DNA AMP PROBE: CPT | Performed by: PHYSICIAN ASSISTANT

## 2025-02-10 RX ORDER — AZITHROMYCIN 250 MG/1
TABLET, FILM COATED ORAL
Qty: 6 TABLET | Refills: 0 | Status: SHIPPED | OUTPATIENT
Start: 2025-02-10 | End: 2025-02-15

## 2025-02-10 RX ORDER — CEFDINIR 300 MG/1
300 CAPSULE ORAL 2 TIMES DAILY
Qty: 20 CAPSULE | Refills: 0 | Status: SHIPPED | OUTPATIENT
Start: 2025-02-10 | End: 2025-02-20

## 2025-02-10 RX ORDER — CODEINE PHOSPHATE AND GUAIFENESIN 10; 100 MG/5ML; MG/5ML
2 SOLUTION ORAL
Qty: 100 ML | Refills: 0 | Status: SHIPPED | OUTPATIENT
Start: 2025-02-10

## 2025-02-10 RX ORDER — PREDNISONE 20 MG/1
40 TABLET ORAL DAILY
Qty: 10 TABLET | Refills: 0 | Status: SHIPPED | OUTPATIENT
Start: 2025-02-10 | End: 2025-02-15

## 2025-02-10 NOTE — PATIENT INSTRUCTIONS
Take Prednisone 2 pills daily for five days. This medicine is best taken in the morning and with food. Taking it too close to bed time can keep you awake. Avoid NSAIDs medicines such as Ibuprofen while taking Prednisone, as when combined it can cause stomach ulcers. Instead you may take Tylenol for additional pain relief.     Take Cefdinir and Azithromycin with food.     May take Guaifenesin AC syrup before bed. May take Tessalon Perles during the day.     You will be able to see the final result of your chest x-ray once it is over read by radiologist via [employer].  You will also be able to see the results of your respiratory panel in about 24 to 48 hours.  We will call you if there is any need to change your treatment plan.    I have placed a referral to pulmonology.  A referral scheduling team member should reach out to you within the next 2 business days to help assist you in scheduling.  If your appointment with pulmonology is out to multiple months I recommend scheduling a follow-up appointment with your primary care provider to discuss if being on an inhaled corticosteroid would be beneficial to you.

## 2025-02-10 NOTE — PROGRESS NOTES
Patient presents with:  Cough: X1 WEEK    Generalized Body Aches: X1 WEEK    Fever      Clinical Decision Making:  Patient experiencing systemic symptoms along with acute cough for the past week.  She dealt with a subacute cough in late December and early January.  No other prior known history of lung disease.  She is quite wheezy on her exam and worse wheezing on left side.  Due to asymmetry I recommend chest x-ray which is consistent with a left lower lobe pneumonia.  I do wonder about the possibility of her having had RSV leading to ongoing inflammatory changes and severity of wheezing now.  Influenza test was negative today, but respiratory panel was collected prior to chest x-ray results being complete.  She may benefit from being on an inhaled corticosteroid.  I did place a referral to pulmonary for pulmonary function testing and she may follow-up with her primary care provider if still having wheezing issues.  Patient started on her third round of prednisone in the past 3 months.      ICD-10-CM    1. Pneumonia of left lower lobe due to infectious organism  J18.9       2. Acute cough  R05.1 Influenza A & B Antigen - Clinic Collect     XR Chest 2 Views     Respiratory Panel PCR     predniSONE (DELTASONE) 20 MG tablet     guaiFENesin-codeine (ROBITUSSIN AC) 100-10 MG/5ML solution     Adult Pulmonary Medicine  Referral     cefdinir (OMNICEF) 300 MG capsule     azithromycin (ZITHROMAX) 250 MG tablet          Patient Instructions   Take Prednisone 2 pills daily for five days. This medicine is best taken in the morning and with food. Taking it too close to bed time can keep you awake. Avoid NSAIDs medicines such as Ibuprofen while taking Prednisone, as when combined it can cause stomach ulcers. Instead you may take Tylenol for additional pain relief.     Take Cefdinir and Azithromycin with food.     May take Guaifenesin AC syrup before bed. May take Tessalon Perles during the day.     You will be able to  see the final result of your chest x-ray once it is over read by radiologist via [employer].  You will also be able to see the results of your respiratory panel in about 24 to 48 hours.  We will call you if there is any need to change your treatment plan.    I have placed a referral to pulmonology.  A referral scheduling team member should reach out to you within the next 2 business days to help assist you in scheduling.  If your appointment with pulmonology is out to multiple months I recommend scheduling a follow-up appointment with your primary care provider to discuss if being on an inhaled corticosteroid would be beneficial to you.     HPI:  Ana Barrera is a 59 year old female who presents today with concerns of cough, body aches, and fever x 1 week. Her granddaughter recently had a febrile illness. She was seen last month and the month before for for a cough and was prescribed doxycycline at 1 visit, azithromycin at another visit.  Guaifenesin AC, Tessalon Perles, and multiple rounds of prednisone have been utilized.  The only testing done during previous visits include strep and pertussis.  No chest x-rays done with the subacute cough from late 2024 into 2025.  No prior known history of lung disease.  She is not on any controlling steroid inhalers, but has been previously prescribed albuterol.  Her cough is keeping her up.  She never previously had any fevers with her previous cough.    History obtained from the patient.    Problem List:  2019-03: Menopausal syndrome (hot flashes)  2019-03: Other insomnia  2019-03: Other fatigue  2019-03: Moderate episode of recurrent major depressive disorder (H)  Generalized Convulsive Grand Mal Seizure  Amenorrhea      Past Medical History:   Diagnosis Date    Epilepsy (H)        Social History     Tobacco Use    Smoking status: Never     Passive exposure: Yes    Smokeless tobacco: Never    Tobacco comments:      smokes in and out of home   Substance Use Topics     Alcohol use: Not on file         Review of Systems    Vitals:    02/10/25 1122   BP: 127/80   BP Location: Right arm   Patient Position: Sitting   Cuff Size: Adult Large   Pulse: 105   Resp: 16   Temp: 98.4  F (36.9  C)   TempSrc: Oral   SpO2: 96%   Weight: 95.3 kg (210 lb)       Physical Exam  Vitals and nursing note reviewed.   Constitutional:       General: She is not in acute distress.     Appearance: She is not toxic-appearing or diaphoretic.   HENT:      Head: Normocephalic and atraumatic.      Right Ear: External ear normal.      Left Ear: External ear normal.   Eyes:      Conjunctiva/sclera: Conjunctivae normal.   Cardiovascular:      Rate and Rhythm: Regular rhythm. Tachycardia present.      Heart sounds: No murmur heard.  Pulmonary:      Effort: Pulmonary effort is normal. No respiratory distress.      Breath sounds: Wheezing (more pronounced on left side, but present on right also) present.   Neurological:      Mental Status: She is alert.   Psychiatric:         Mood and Affect: Mood normal.         Behavior: Behavior normal.         Thought Content: Thought content normal.         Judgment: Judgment normal.         Results:  Results for orders placed or performed in visit on 02/10/25   XR Chest 2 Views     Status: None    Narrative    EXAM: XR CHEST 2 VIEWS  LOCATION: [facility]  DATE: 2/10/2025    INDICATION: Hx of pneumonia in 2023. Wheezing bilateral lungs, but worse on left. Cough and fevers x 1 week. Recent abx in Jan for diff cough.  COMPARISON: PA and lateral views of the chest 12/28/2023      Impression    IMPRESSION:     There is a airspace opacity in the left lung laterally lateral to the left ventricular heart border consistent with focal pneumonia. No other airspace opacities. There is a thin band of linear atelectasis in the right middle lobe anteriorly. Symmetric   normal lung inflation. No pleural effusion or pneumothorax.    There are minimal thoracic spine  degenerative osteophytes. Unchanged mature callus associated with fractures of the right posterolateral sixth, seventh, and eighth ribs.   Results for orders placed or performed in visit on 02/10/25   Influenza A & B Antigen - Clinic Collect     Status: Normal    Specimen: Nose; Swab   Result Value Ref Range    Influenza A antigen Negative Negative    Influenza B antigen Negative Negative    Narrative    Test results must be correlated with clinical data. If necessary, results should be confirmed by a molecular assay or viral culture.         At the end of the encounter, I discussed results, diagnosis, medications. Discussed red flags for immediate return to clinic/ER, as well as indications for follow up if no improvement. Patient understood and agreed to plan. Patient was stable for discharge.

## 2025-02-11 ENCOUNTER — TELEPHONE (OUTPATIENT)
Dept: URGENT CARE | Facility: URGENT CARE | Age: 60
End: 2025-02-11

## 2025-02-11 NOTE — TELEPHONE ENCOUNTER
----- Message from Tara Olmstead sent at 2/10/2025  7:56 PM CST -----  If, tomorrow morning the patient still has not seen this lab result please give her a call and let her know everything was negative.  No change to treatment plan.  Patient had confirmed pneumonia on chest x-ray, which is already being treated.

## 2025-02-12 NOTE — TELEPHONE ENCOUNTER
RN spoke with pt and relayed results. Pt verbalized understanding.       Pt did ask Rn about a refill of her paroxetine. Pt stated that provider that she saw during UC visit was agreeable to an order. Rn did not see any documentation of that conversation in charting. RN stated that she will send to provider pool in order to review. RN also relayed to pt that she couldn't guarantee a refill as different providers were on today. Pt verbalized understanding, but also stressed that she is in between doctors right now and is concerned about not getting her medication.     Pt had no further questions.       RN will route to provider pool for further guidance.    Columba CROSS, RN

## 2025-02-12 NOTE — TELEPHONE ENCOUNTER
Rn sent detailed mychart to pt.  is not able to provide a refill for this type of medication. PCP will need to be established. Request for medication is already on file from the 3rd of Feb.     Pt also already received a ladonna refill in November.       Columba CROSS RN

## 2025-02-17 NOTE — TELEPHONE ENCOUNTER
Left voicemail requesting patient return call. Please relay clinician message should patient call back.    Note: Assigned PCP is Aida Carreon PA-C.    Diya Herrera RN  [facility]

## 2025-02-18 DIAGNOSIS — F33.1 MODERATE EPISODE OF RECURRENT MAJOR DEPRESSIVE DISORDER (H): ICD-10-CM

## 2025-02-18 DIAGNOSIS — R05.1 ACUTE COUGH: ICD-10-CM

## 2025-02-18 DIAGNOSIS — Z76.0 ENCOUNTER FOR MEDICATION REFILL: ICD-10-CM

## 2025-02-18 RX ORDER — PAROXETINE 40 MG/1
40 TABLET, FILM COATED ORAL DAILY
Qty: 90 TABLET | Refills: 0 | Status: SHIPPED | OUTPATIENT
Start: 2025-02-18

## 2025-02-18 RX ORDER — PAROXETINE 40 MG/1
40 TABLET, FILM COATED ORAL DAILY
Qty: 90 TABLET | Refills: 0 | OUTPATIENT
Start: 2025-02-18

## 2025-02-18 RX ORDER — CODEINE PHOSPHATE AND GUAIFENESIN 10; 100 MG/5ML; MG/5ML
2 SOLUTION ORAL
Qty: 100 ML | Refills: 0 | OUTPATIENT
Start: 2025-02-18

## 2025-02-18 NOTE — TELEPHONE ENCOUNTER
RN called and spoke to patient. Pt reported she still takes paroxetine 40 mg.  Aida sent Rx today. Pt verbalized understanding.    Routed to Aida to review.   I'm not able to remove this order. A different Rx was sent on 2/18/25.    Bernard BARRIOS RN  [facility]

## 2025-02-18 NOTE — TELEPHONE ENCOUNTER
Medication Question or Refill        What medication are you calling about (include dose and sig)?: guaiFENesin-codeine (ROBITUSSIN AC) 100-10 MG/5ML solution       PARoxetine (PAXIL) 40 MG tablet       Preferred Pharmacy    [id number] - [place], [place] - [address]  [address]  [place] [address]  Phone: [phone] Fax: [phone]        Controlled Substance Agreement on file:   CSA -- Patient Level:    CSA: None found at the patient level.       Who prescribed the medication?:    Do you need a refill? Yes    When did you use the medication last? N/A    Patient offered an appointment? Yes: appt scheduled on 2/25/25 with     Do you have any questions or concerns?  No      Could we send this information to you in [place] or would you prefer to receive a phone call?:   Patient would prefer a phone call   Okay to leave a detailed message?: Yes at Cell number on file:    Telephone Information:   Mobile [phone]

## 2025-06-16 DIAGNOSIS — F33.1 MODERATE EPISODE OF RECURRENT MAJOR DEPRESSIVE DISORDER (H): ICD-10-CM

## 2025-06-16 DIAGNOSIS — N95.1 SYMPTOMATIC MENOPAUSAL OR FEMALE CLIMACTERIC STATES: ICD-10-CM

## 2025-06-16 DIAGNOSIS — Z76.0 ENCOUNTER FOR MEDICATION REFILL: ICD-10-CM

## 2025-06-16 RX ORDER — PAROXETINE 40 MG/1
40 TABLET, FILM COATED ORAL DAILY
Qty: 90 TABLET | Refills: 0 | Status: SHIPPED | OUTPATIENT
Start: 2025-06-16

## 2025-06-16 RX ORDER — GABAPENTIN 100 MG/1
CAPSULE ORAL
Qty: 90 CAPSULE | Refills: 3 | Status: SHIPPED | OUTPATIENT
Start: 2025-06-16